# Patient Record
Sex: FEMALE | Race: WHITE | NOT HISPANIC OR LATINO | Employment: FULL TIME | URBAN - METROPOLITAN AREA
[De-identification: names, ages, dates, MRNs, and addresses within clinical notes are randomized per-mention and may not be internally consistent; named-entity substitution may affect disease eponyms.]

---

## 2018-06-15 ENCOUNTER — CONVERSION ENCOUNTER (OUTPATIENT)
Dept: MAMMOGRAPHY | Facility: HOSPITAL | Age: 53
End: 2018-06-15

## 2018-07-27 ENCOUNTER — OFFICE VISIT CONVERTED (OUTPATIENT)
Dept: FAMILY MEDICINE CLINIC | Facility: CLINIC | Age: 53
End: 2018-07-27
Attending: NURSE PRACTITIONER

## 2019-11-01 ENCOUNTER — HOSPITAL ENCOUNTER (OUTPATIENT)
Dept: MAMMOGRAPHY | Facility: HOSPITAL | Age: 54
Discharge: HOME | End: 2019-11-01
Attending: FAMILY MEDICINE

## 2020-02-03 ENCOUNTER — HOSPITAL ENCOUNTER (OUTPATIENT)
Dept: LAB | Facility: HOSPITAL | Age: 55
Discharge: HOME | End: 2020-02-03

## 2020-02-03 LAB
ALBUMIN SERPL-MCNC: 4.5 G/DL (ref 3.5–5)
ALBUMIN/GLOB SERPL: 1.7 {RATIO} (ref 1.4–2.6)
ALP SERPL-CCNC: 47 U/L (ref 53–141)
ALT SERPL-CCNC: 25 U/L (ref 10–40)
ANION GAP SERPL CALC-SCNC: 15 MMOL/L (ref 8–19)
AST SERPL-CCNC: 18 U/L (ref 15–50)
BASOPHILS # BLD AUTO: 0.06 10*3/UL (ref 0–0.2)
BASOPHILS NFR BLD AUTO: 1.1 % (ref 0–3)
BILIRUB SERPL-MCNC: 0.65 MG/DL (ref 0.2–1.3)
BUN SERPL-MCNC: 11 MG/DL (ref 5–25)
BUN/CREAT SERPL: 12 {RATIO} (ref 6–20)
CALCIUM SERPL-MCNC: 9.1 MG/DL (ref 8.7–10.4)
CHLORIDE SERPL-SCNC: 101 MMOL/L (ref 99–111)
CHOLEST SERPL-MCNC: 214 MG/DL (ref 107–200)
CHOLEST/HDLC SERPL: 3.5 {RATIO} (ref 3–6)
CONV ABS IMM GRAN: 0.01 10*3/UL (ref 0–0.2)
CONV CO2: 25 MMOL/L (ref 22–32)
CONV IMMATURE GRAN: 0.2 % (ref 0–1.8)
CONV TOTAL PROTEIN: 7.1 G/DL (ref 6.3–8.2)
CREAT UR-MCNC: 0.91 MG/DL (ref 0.5–0.9)
DEPRECATED RDW RBC AUTO: 40 FL (ref 36.4–46.3)
EOSINOPHIL # BLD AUTO: 0.12 10*3/UL (ref 0–0.7)
EOSINOPHIL # BLD AUTO: 2.1 % (ref 0–7)
ERYTHROCYTE [DISTWIDTH] IN BLOOD BY AUTOMATED COUNT: 11.7 % (ref 11.7–14.4)
GFR SERPLBLD BASED ON 1.73 SQ M-ARVRAT: >60 ML/MIN/{1.73_M2}
GLOBULIN UR ELPH-MCNC: 2.6 G/DL (ref 2–3.5)
GLUCOSE SERPL-MCNC: 92 MG/DL (ref 65–99)
HCT VFR BLD AUTO: 41.2 % (ref 37–47)
HDLC SERPL-MCNC: 62 MG/DL (ref 40–60)
HGB BLD-MCNC: 13.6 G/DL (ref 12–16)
LDLC SERPL CALC-MCNC: 133 MG/DL (ref 70–100)
LYMPHOCYTES # BLD AUTO: 1.5 10*3/UL (ref 1–5)
LYMPHOCYTES NFR BLD AUTO: 26.8 % (ref 20–45)
MCH RBC QN AUTO: 30.8 PG (ref 27–31)
MCHC RBC AUTO-ENTMCNC: 33 G/DL (ref 33–37)
MCV RBC AUTO: 93.2 FL (ref 81–99)
MONOCYTES # BLD AUTO: 0.39 10*3/UL (ref 0.2–1.2)
MONOCYTES NFR BLD AUTO: 7 % (ref 3–10)
NEUTROPHILS # BLD AUTO: 3.51 10*3/UL (ref 2–8)
NEUTROPHILS NFR BLD AUTO: 62.8 % (ref 30–85)
NRBC CBCN: 0 % (ref 0–0.7)
OSMOLALITY SERPL CALC.SUM OF ELEC: 283 MOSM/KG (ref 273–304)
PLATELET # BLD AUTO: 267 10*3/UL (ref 130–400)
PMV BLD AUTO: 10.5 FL (ref 9.4–12.3)
POTASSIUM SERPL-SCNC: 4.2 MMOL/L (ref 3.5–5.3)
RBC # BLD AUTO: 4.42 10*6/UL (ref 4.2–5.4)
SODIUM SERPL-SCNC: 137 MMOL/L (ref 135–147)
TRIGL SERPL-MCNC: 97 MG/DL (ref 40–150)
TSH SERPL-ACNC: 3.04 M[IU]/L (ref 0.27–4.2)
VLDLC SERPL-MCNC: 19 MG/DL (ref 5–37)
WBC # BLD AUTO: 5.59 10*3/UL (ref 4.8–10.8)

## 2020-02-04 LAB
CONV MUMPS ANTIBODY IGG: 267 AU/ML
HBV SURFACE AB SER QL: REACTIVE
MEV IGG SER IA-ACNC: 26.3 AU/ML
RUBV IGG SER-ACNC: 35.01 [IU]/ML
VZV IGG SER IA-ACNC: 854 INDEX

## 2020-06-05 ENCOUNTER — HOSPITAL ENCOUNTER (OUTPATIENT)
Dept: URGENT CARE | Facility: CLINIC | Age: 55
Discharge: HOME | End: 2020-06-05
Attending: NURSE PRACTITIONER

## 2020-06-05 LAB — SARS-COV-2 RNA SPEC QL NAA+PROBE: NOT DETECTED

## 2020-08-08 ENCOUNTER — HOSPITAL ENCOUNTER (OUTPATIENT)
Dept: URGENT CARE | Facility: CLINIC | Age: 55
Discharge: HOME | End: 2020-08-08

## 2020-12-10 ENCOUNTER — HOSPITAL ENCOUNTER (OUTPATIENT)
Dept: URGENT CARE | Facility: CLINIC | Age: 55
Discharge: HOME | End: 2020-12-10
Attending: EMERGENCY MEDICINE

## 2020-12-12 LAB
BACTERIA SPEC AEROBE CULT: NORMAL
SARS-COV-2 RNA SPEC QL NAA+PROBE: DETECTED

## 2020-12-17 ENCOUNTER — HOSPITAL ENCOUNTER (OUTPATIENT)
Dept: URGENT CARE | Facility: CLINIC | Age: 55
Discharge: HOME | End: 2020-12-17

## 2021-02-24 ENCOUNTER — HOSPITAL ENCOUNTER (OUTPATIENT)
Dept: OTHER | Facility: HOSPITAL | Age: 56
Discharge: HOME | End: 2021-02-24

## 2021-02-24 LAB
ALBUMIN SERPL-MCNC: 4.2 G/DL (ref 3.5–5)
ALBUMIN/GLOB SERPL: 1.6 {RATIO} (ref 1.4–2.6)
ALP SERPL-CCNC: 54 U/L (ref 53–141)
ALT SERPL-CCNC: 70 U/L (ref 10–40)
ANION GAP SERPL CALC-SCNC: 11 MMOL/L (ref 8–19)
AST SERPL-CCNC: 53 U/L (ref 15–50)
BASOPHILS # BLD AUTO: 0.06 10*3/UL (ref 0–0.2)
BASOPHILS NFR BLD AUTO: 1.1 % (ref 0–3)
BILIRUB SERPL-MCNC: 0.88 MG/DL (ref 0.2–1.3)
BUN SERPL-MCNC: 11 MG/DL (ref 5–25)
BUN/CREAT SERPL: 10 {RATIO} (ref 6–20)
CALCIUM SERPL-MCNC: 9 MG/DL (ref 8.7–10.4)
CHLORIDE SERPL-SCNC: 103 MMOL/L (ref 99–111)
CHOLEST SERPL-MCNC: 218 MG/DL (ref 107–200)
CHOLEST/HDLC SERPL: 4.2 {RATIO} (ref 3–6)
CONV ABS IMM GRAN: 0.01 10*3/UL (ref 0–0.2)
CONV CO2: 26 MMOL/L (ref 22–32)
CONV IMMATURE GRAN: 0.2 % (ref 0–1.8)
CONV TOTAL PROTEIN: 6.8 G/DL (ref 6.3–8.2)
CREAT UR-MCNC: 1.06 MG/DL (ref 0.5–0.9)
DEPRECATED RDW RBC AUTO: 41.8 FL (ref 36.4–46.3)
EOSINOPHIL # BLD AUTO: 0.13 10*3/UL (ref 0–0.7)
EOSINOPHIL # BLD AUTO: 2.3 % (ref 0–7)
ERYTHROCYTE [DISTWIDTH] IN BLOOD BY AUTOMATED COUNT: 12.4 % (ref 11.7–14.4)
GFR SERPLBLD BASED ON 1.73 SQ M-ARVRAT: 59 ML/MIN/{1.73_M2}
GLOBULIN UR ELPH-MCNC: 2.6 G/DL (ref 2–3.5)
GLUCOSE SERPL-MCNC: 103 MG/DL (ref 65–99)
HCT VFR BLD AUTO: 42.2 % (ref 37–47)
HDLC SERPL-MCNC: 52 MG/DL (ref 40–60)
HGB BLD-MCNC: 13.7 G/DL (ref 12–16)
LDLC SERPL CALC-MCNC: 147 MG/DL (ref 70–100)
LYMPHOCYTES # BLD AUTO: 1.41 10*3/UL (ref 1–5)
LYMPHOCYTES NFR BLD AUTO: 25.4 % (ref 20–45)
MCH RBC QN AUTO: 29.8 PG (ref 27–31)
MCHC RBC AUTO-ENTMCNC: 32.5 G/DL (ref 33–37)
MCV RBC AUTO: 91.9 FL (ref 81–99)
MONOCYTES # BLD AUTO: 0.35 10*3/UL (ref 0.2–1.2)
MONOCYTES NFR BLD AUTO: 6.3 % (ref 3–10)
NEUTROPHILS # BLD AUTO: 3.59 10*3/UL (ref 2–8)
NEUTROPHILS NFR BLD AUTO: 64.7 % (ref 30–85)
NRBC CBCN: 0 % (ref 0–0.7)
OSMOLALITY SERPL CALC.SUM OF ELEC: 282 MOSM/KG (ref 273–304)
PLATELET # BLD AUTO: 286 10*3/UL (ref 130–400)
PMV BLD AUTO: 9.8 FL (ref 9.4–12.3)
POTASSIUM SERPL-SCNC: 4.1 MMOL/L (ref 3.5–5.3)
RBC # BLD AUTO: 4.59 10*6/UL (ref 4.2–5.4)
SODIUM SERPL-SCNC: 136 MMOL/L (ref 135–147)
TRIGL SERPL-MCNC: 96 MG/DL (ref 40–150)
TSH SERPL-ACNC: 2.76 M[IU]/L (ref 0.27–4.2)
VLDLC SERPL-MCNC: 19 MG/DL (ref 5–37)
WBC # BLD AUTO: 5.55 10*3/UL (ref 4.8–10.8)

## 2021-02-25 LAB
EST. AVERAGE GLUCOSE BLD GHB EST-MCNC: 105 MG/DL
HBA1C MFR BLD: 5.3 % (ref 3.5–5.7)

## 2021-05-16 VITALS
RESPIRATION RATE: 16 BRPM | OXYGEN SATURATION: 99 % | WEIGHT: 168 LBS | TEMPERATURE: 98.9 F | HEART RATE: 82 BPM | HEIGHT: 63 IN | SYSTOLIC BLOOD PRESSURE: 123 MMHG | DIASTOLIC BLOOD PRESSURE: 80 MMHG | BODY MASS INDEX: 29.77 KG/M2

## 2021-08-09 PROBLEM — R61 NIGHT SWEATS: Status: ACTIVE | Noted: 2021-08-09

## 2021-08-28 PROCEDURE — U0003 INFECTIOUS AGENT DETECTION BY NUCLEIC ACID (DNA OR RNA); SEVERE ACUTE RESPIRATORY SYNDROME CORONAVIRUS 2 (SARS-COV-2) (CORONAVIRUS DISEASE [COVID-19]), AMPLIFIED PROBE TECHNIQUE, MAKING USE OF HIGH THROUGHPUT TECHNOLOGIES AS DESCRIBED BY CMS-2020-01-R: HCPCS | Performed by: NURSE PRACTITIONER

## 2021-08-31 ENCOUNTER — TELEPHONE (OUTPATIENT)
Dept: URGENT CARE | Facility: CLINIC | Age: 56
End: 2021-08-31

## 2021-09-01 ENCOUNTER — TELEPHONE (OUTPATIENT)
Dept: URGENT CARE | Facility: CLINIC | Age: 56
End: 2021-09-01

## 2021-09-01 NOTE — TELEPHONE ENCOUNTER
----- Message from Jaron Burnham MD sent at 8/30/2021  9:43 PM EDT -----  Please call the patient regarding negative covid.

## 2021-09-02 ENCOUNTER — TELEPHONE (OUTPATIENT)
Dept: OTHER | Facility: OTHER | Age: 56
End: 2021-09-02

## 2021-09-29 ENCOUNTER — OFFICE VISIT (OUTPATIENT)
Dept: FAMILY MEDICINE CLINIC | Facility: CLINIC | Age: 56
End: 2021-09-29

## 2021-09-29 VITALS
BODY MASS INDEX: 34.55 KG/M2 | HEART RATE: 83 BPM | DIASTOLIC BLOOD PRESSURE: 80 MMHG | SYSTOLIC BLOOD PRESSURE: 130 MMHG | WEIGHT: 183 LBS | TEMPERATURE: 97.9 F | OXYGEN SATURATION: 99 % | HEIGHT: 61 IN

## 2021-09-29 DIAGNOSIS — E78.2 MIXED HYPERLIPIDEMIA: ICD-10-CM

## 2021-09-29 DIAGNOSIS — E66.9 OBESITY (BMI 30.0-34.9): ICD-10-CM

## 2021-09-29 DIAGNOSIS — R23.2 HOT FLASHES: ICD-10-CM

## 2021-09-29 DIAGNOSIS — R53.83 FATIGUE, UNSPECIFIED TYPE: ICD-10-CM

## 2021-09-29 DIAGNOSIS — R61 NIGHT SWEATS: ICD-10-CM

## 2021-09-29 DIAGNOSIS — N18.9 CHRONIC KIDNEY DISEASE, UNSPECIFIED CKD STAGE: ICD-10-CM

## 2021-09-29 DIAGNOSIS — R63.5 WEIGHT GAIN: ICD-10-CM

## 2021-09-29 DIAGNOSIS — J30.2 SEASONAL ALLERGIES: ICD-10-CM

## 2021-09-29 DIAGNOSIS — Z12.11 SCREENING FOR COLON CANCER: Primary | ICD-10-CM

## 2021-09-29 DIAGNOSIS — Z12.31 SCREENING MAMMOGRAM, ENCOUNTER FOR: ICD-10-CM

## 2021-09-29 DIAGNOSIS — R74.8 ELEVATED LIVER ENZYMES: ICD-10-CM

## 2021-09-29 PROBLEM — E66.811 OBESITY (BMI 30.0-34.9): Status: ACTIVE | Noted: 2021-09-29

## 2021-09-29 PROBLEM — N20.0 KIDNEY STONES: Status: ACTIVE | Noted: 2021-09-29

## 2021-09-29 LAB
ALBUMIN SERPL-MCNC: 4.6 G/DL (ref 3.5–5.2)
ALBUMIN/GLOB SERPL: 1.9 G/DL
ALP SERPL-CCNC: 51 U/L (ref 39–117)
ALT SERPL W P-5'-P-CCNC: 62 U/L (ref 1–33)
ANION GAP SERPL CALCULATED.3IONS-SCNC: 10 MMOL/L (ref 5–15)
AST SERPL-CCNC: 37 U/L (ref 1–32)
BASOPHILS # BLD AUTO: 0.07 10*3/MM3 (ref 0–0.2)
BASOPHILS NFR BLD AUTO: 1.3 % (ref 0–1.5)
BILIRUB SERPL-MCNC: 0.5 MG/DL (ref 0–1.2)
BUN SERPL-MCNC: 12 MG/DL (ref 6–20)
BUN/CREAT SERPL: 12.9 (ref 7–25)
CALCIUM SPEC-SCNC: 9.4 MG/DL (ref 8.6–10.5)
CHLORIDE SERPL-SCNC: 103 MMOL/L (ref 98–107)
CHOLEST SERPL-MCNC: 218 MG/DL (ref 0–200)
CO2 SERPL-SCNC: 25 MMOL/L (ref 22–29)
CREAT SERPL-MCNC: 0.93 MG/DL (ref 0.57–1)
DEPRECATED RDW RBC AUTO: 43.7 FL (ref 37–54)
EOSINOPHIL # BLD AUTO: 0.15 10*3/MM3 (ref 0–0.4)
EOSINOPHIL NFR BLD AUTO: 2.7 % (ref 0.3–6.2)
ERYTHROCYTE [DISTWIDTH] IN BLOOD BY AUTOMATED COUNT: 12.6 % (ref 12.3–15.4)
FERRITIN SERPL-MCNC: 167 NG/ML (ref 13–150)
FOLATE SERPL-MCNC: 10.8 NG/ML (ref 4.78–24.2)
GFR SERPL CREATININE-BSD FRML MDRD: 63 ML/MIN/1.73
GLOBULIN UR ELPH-MCNC: 2.4 GM/DL
GLUCOSE SERPL-MCNC: 95 MG/DL (ref 65–99)
HCT VFR BLD AUTO: 42 % (ref 34–46.6)
HDLC SERPL-MCNC: 52 MG/DL (ref 40–60)
HGB BLD-MCNC: 14 G/DL (ref 12–15.9)
IMM GRANULOCYTES # BLD AUTO: 0.01 10*3/MM3 (ref 0–0.05)
IMM GRANULOCYTES NFR BLD AUTO: 0.2 % (ref 0–0.5)
IRON 24H UR-MRATE: 69 MCG/DL (ref 37–145)
IRON SATN MFR SERPL: 16 % (ref 20–50)
LDLC SERPL CALC-MCNC: 145 MG/DL (ref 0–100)
LDLC/HDLC SERPL: 2.75 {RATIO}
LYMPHOCYTES # BLD AUTO: 2.02 10*3/MM3 (ref 0.7–3.1)
LYMPHOCYTES NFR BLD AUTO: 36.4 % (ref 19.6–45.3)
MAGNESIUM SERPL-MCNC: 1.9 MG/DL (ref 1.6–2.6)
MCH RBC QN AUTO: 30.9 PG (ref 26.6–33)
MCHC RBC AUTO-ENTMCNC: 33.3 G/DL (ref 31.5–35.7)
MCV RBC AUTO: 92.7 FL (ref 79–97)
MONOCYTES # BLD AUTO: 0.54 10*3/MM3 (ref 0.1–0.9)
MONOCYTES NFR BLD AUTO: 9.7 % (ref 5–12)
NEUTROPHILS NFR BLD AUTO: 2.76 10*3/MM3 (ref 1.7–7)
NEUTROPHILS NFR BLD AUTO: 49.7 % (ref 42.7–76)
NRBC BLD AUTO-RTO: 0 /100 WBC (ref 0–0.2)
PLATELET # BLD AUTO: 316 10*3/MM3 (ref 140–450)
PMV BLD AUTO: 10.5 FL (ref 6–12)
POTASSIUM SERPL-SCNC: 4.2 MMOL/L (ref 3.5–5.2)
PROT SERPL-MCNC: 7 G/DL (ref 6–8.5)
RBC # BLD AUTO: 4.53 10*6/MM3 (ref 3.77–5.28)
SODIUM SERPL-SCNC: 138 MMOL/L (ref 136–145)
T3 SERPL-MCNC: 125 NG/DL (ref 80–200)
T4 SERPL-MCNC: 7.77 MCG/DL (ref 4.5–11.7)
TIBC SERPL-MCNC: 423 MCG/DL (ref 298–536)
TRANSFERRIN SERPL-MCNC: 284 MG/DL (ref 200–360)
TRIGL SERPL-MCNC: 116 MG/DL (ref 0–150)
TSH SERPL DL<=0.05 MIU/L-ACNC: 3.43 UIU/ML (ref 0.27–4.2)
VIT B12 BLD-MCNC: 497 PG/ML (ref 211–946)
VLDLC SERPL-MCNC: 21 MG/DL (ref 5–40)
WBC # BLD AUTO: 5.55 10*3/MM3 (ref 3.4–10.8)

## 2021-09-29 PROCEDURE — 84480 ASSAY TRIIODOTHYRONINE (T3): CPT | Performed by: NURSE PRACTITIONER

## 2021-09-29 PROCEDURE — 99214 OFFICE O/P EST MOD 30 MIN: CPT | Performed by: NURSE PRACTITIONER

## 2021-09-29 PROCEDURE — 85025 COMPLETE CBC W/AUTO DIFF WBC: CPT | Performed by: NURSE PRACTITIONER

## 2021-09-29 PROCEDURE — 84443 ASSAY THYROID STIM HORMONE: CPT | Performed by: NURSE PRACTITIONER

## 2021-09-29 PROCEDURE — 84436 ASSAY OF TOTAL THYROXINE: CPT | Performed by: NURSE PRACTITIONER

## 2021-09-29 PROCEDURE — 82728 ASSAY OF FERRITIN: CPT | Performed by: NURSE PRACTITIONER

## 2021-09-29 PROCEDURE — 83735 ASSAY OF MAGNESIUM: CPT | Performed by: NURSE PRACTITIONER

## 2021-09-29 PROCEDURE — 80053 COMPREHEN METABOLIC PANEL: CPT | Performed by: NURSE PRACTITIONER

## 2021-09-29 PROCEDURE — 83540 ASSAY OF IRON: CPT | Performed by: NURSE PRACTITIONER

## 2021-09-29 PROCEDURE — 82746 ASSAY OF FOLIC ACID SERUM: CPT | Performed by: NURSE PRACTITIONER

## 2021-09-29 PROCEDURE — 82607 VITAMIN B-12: CPT | Performed by: NURSE PRACTITIONER

## 2021-09-29 PROCEDURE — 80061 LIPID PANEL: CPT | Performed by: NURSE PRACTITIONER

## 2021-09-29 PROCEDURE — 84466 ASSAY OF TRANSFERRIN: CPT | Performed by: NURSE PRACTITIONER

## 2021-09-29 RX ORDER — VENLAFAXINE HYDROCHLORIDE 37.5 MG/1
37.5 CAPSULE, EXTENDED RELEASE ORAL DAILY
Qty: 30 CAPSULE | Refills: 2 | Status: SHIPPED | OUTPATIENT
Start: 2021-09-29 | End: 2021-10-27 | Stop reason: SDUPTHER

## 2021-09-29 RX ORDER — CLONIDINE HYDROCHLORIDE 0.1 MG/1
0.1 TABLET ORAL NIGHTLY
Qty: 30 TABLET | Refills: 2 | Status: SHIPPED | OUTPATIENT
Start: 2021-09-29 | End: 2021-10-27 | Stop reason: SDUPTHER

## 2021-09-29 NOTE — PROGRESS NOTES
[facility] [[id number]]     New Patient Office Visit      Patient Name: Luis Masterson  : 1965   MRN: [id number]     Chief Complaint:    Chief Complaint   Patient presents with   • Fatigue   • Weight Gain   • Establish Care       History of Present Illness: Luis Masterson is a 56 y.o. female who is here today to establish care.  Follow up hyperlipidemia, elevated liver enzymes, and seasonal allergies    C/O-hormone or thyroid issue, weight gain, fatigue, hot flashes and mood swings   Feels she is going through menopause   Family history of breast cancer mother, maternal grandmother and aunt     PCP-patient reports she has not had PCP in long time  Labs- per employee health reviewed liver enzymes elevated and chronic any disease  Gmzodvblm-  Pap- dr mi  Colonoscopy none   LMP Ablation   Subjective      Review of Systems:   Review of Systems   Constitutional: Positive for unexpected weight change. Negative for fever.   HENT: Negative for ear pain, sinus pain and sore throat.    Eyes: Negative for discharge.   Cardiovascular: Negative for chest pain.   Gastrointestinal: Negative for abdominal pain, nausea and vomiting.   Genitourinary: Negative for dysuria.   Musculoskeletal: Negative for myalgias.   Skin: Negative for rash.   Allergic/Immunologic: Positive for environmental allergies.   Neurological: Negative for headaches.        Past Medical History:   Past Medical History:   Diagnosis Date   • Kidney stones    • Night sweats        Past Surgical History:   Past Surgical History:   Procedure Laterality Date   • CARPAL TUNNEL RELEASE     • CHOLECYSTECTOMY     • ENDOMETRIAL ABLATION     • KIDNEY STONE SURGERY     • MASTECTOMY Bilateral    • THORACIC OUTLET SURGERY         Family History:   Family History   Problem Relation Age of Onset   • Breast cancer Other    • Stroke Other    • Heart disease Other    • Diabetes Other        Social History:   Social History     Socioeconomic History  "  • Marital status:      Spouse name: Not on file   • Number of children: Not on file   • Years of education: Not on file   • Highest education level: Not on file   Tobacco Use   • Smoking status: Former Smoker   • Smokeless tobacco: Never Used   Vaping Use   • Vaping Use: Never used   Substance and Sexual Activity   • Alcohol use: Never   • Drug use: Never   • Sexual activity: Defer       Medications:     Current Outpatient Medications:   •  cloNIDine (Catapres) 0.1 MG tablet, Take 1 tablet by mouth Every Night., Disp: 30 tablet, Rfl: 2  •  Liraglutide (SAXENDA) 18 MG/3ML injection pen, Inject 3 mg under the skin into the appropriate area as directed Daily., Disp: 5 pen, Rfl: 5  •  venlafaxine XR (Effexor XR) 37.5 MG 24 hr capsule, Take 1 capsule by mouth Daily., Disp: 30 capsule, Rfl: 2    Allergies:   Allergies   Allergen Reactions   • Sulfa Antibiotics Swelling       Objective     Physical Exam:  Vital Signs:   Vitals:    09/29/21 0723   BP: 130/80   Pulse: 83   Temp: 97.9 °F (36.6 °C)   SpO2: 99%   Weight: 83 kg (183 lb)   Height: 154.9 cm (61\")     Body mass index is 34.58 kg/m².     Physical Exam  HENT:      Right Ear: Tympanic membrane normal.      Left Ear: Tympanic membrane normal.      Nose: Nose normal.      Mouth/Throat:      Mouth: Mucous membranes are moist.   Eyes:      Conjunctiva/sclera: Conjunctivae normal.      Pupils: Pupils are equal, round, and reactive to light.   Neck:      Vascular: No carotid bruit.   Cardiovascular:      Rate and Rhythm: Normal rate and regular rhythm.      Heart sounds: Normal heart sounds. No murmur heard.     Pulmonary:      Effort: Pulmonary effort is normal.      Breath sounds: Normal breath sounds.   Abdominal:      General: Bowel sounds are normal.      Palpations: Abdomen is soft.   Musculoskeletal:      Cervical back: Neck supple. No tenderness.      Right lower leg: No edema.      Left lower leg: No edema.   Skin:     General: Skin is warm and dry.      " Capillary Refill: Capillary refill takes less than 2 seconds.   Neurological:      Mental Status: She is alert and oriented to person, place, and time.   Psychiatric:         Mood and Affect: Mood normal.         Behavior: Behavior normal.             Assessment / Plan      Assessment/Plan:   Diagnoses and all orders for this visit:    1. Screening for colon cancer (Primary)  -     Ambulatory Referral For Screening Colonoscopy    2. Chronic kidney disease, unspecified CKD stage    3. Elevated liver enzymes  -     Comprehensive Metabolic Panel    4. Mixed hyperlipidemia  -     Lipid Panel    5. Seasonal allergies    6. Fatigue, unspecified type  -     TSH  -     T3  -     T4  -     Iron Profile  -     Ferritin  -     Vitamin B12  -     CBC Auto Differential  -     Folate  -     Magnesium    7. Hot flashes  -     Magnesium    8. Weight gain  -     TSH  -     T3  -     T4  -     Magnesium    9. Night sweats  -     Magnesium    10. Screening mammogram, encounter for  -     Mammo Screening Digital Tomosynthesis Bilateral With CAD; Future    11. Obesity (BMI 30.0-34.9)    Other orders  -     venlafaxine XR (Effexor XR) 37.5 MG 24 hr capsule; Take 1 capsule by mouth Daily.  Dispense: 30 capsule; Refill: 2  -     cloNIDine (Catapres) 0.1 MG tablet; Take 1 tablet by mouth Every Night.  Dispense: 30 tablet; Refill: 2  -     Liraglutide (SAXENDA) 18 MG/3ML injection pen; Inject 3 mg under the skin into the appropriate area as directed Daily.  Dispense: 5 pen; Refill: 5       Hot flashes we will start Effexor once daily in the morning with food  Night sweats will start clonidine prior to bedtime  Weight gain and fatigue will obtain labs  Elevated liver enzymes will recheck today in office  Chronic kidney disease avoid NSAIDs increase water intake will recheck today in office  Obesity we will start Saxenda recommend increase caloric intake exercise 30 minutes daily patient ports he drinks 1 to 2 gallons of water per day  We will  "provide screen mammogram order patient denies lumps mass tenderness blood or discharge from the nipple  We will follow-up in 1 month for Pap smear    Follow Up:   Return in about 1 month (around 10/29/2021).    Melinda Torres, APRN      \"Please note that portions of this note were completed with a voice recognition program.\"    "

## 2021-10-07 DIAGNOSIS — R74.8 ELEVATED LIVER ENZYMES: Primary | ICD-10-CM

## 2021-10-21 ENCOUNTER — HOSPITAL ENCOUNTER (OUTPATIENT)
Dept: ULTRASOUND IMAGING | Facility: HOSPITAL | Age: 56
Discharge: HOME | End: 2021-10-21
Admitting: NURSE PRACTITIONER

## 2021-10-21 DIAGNOSIS — R74.8 ELEVATED LIVER ENZYMES: ICD-10-CM

## 2021-10-21 PROCEDURE — 76705 ECHO EXAM OF ABDOMEN: CPT

## 2021-10-27 ENCOUNTER — OFFICE VISIT (OUTPATIENT)
Dept: FAMILY MEDICINE CLINIC | Facility: CLINIC | Age: 56
End: 2021-10-27

## 2021-10-27 VITALS
OXYGEN SATURATION: 97 % | HEIGHT: 61 IN | TEMPERATURE: 98 F | WEIGHT: 170 LBS | SYSTOLIC BLOOD PRESSURE: 129 MMHG | DIASTOLIC BLOOD PRESSURE: 84 MMHG | BODY MASS INDEX: 32.1 KG/M2 | HEART RATE: 88 BPM

## 2021-10-27 DIAGNOSIS — E66.9 OBESITY (BMI 30.0-34.9): ICD-10-CM

## 2021-10-27 DIAGNOSIS — B37.89 CANDIDIASIS OF BREAST: ICD-10-CM

## 2021-10-27 DIAGNOSIS — R61 NIGHT SWEATS: ICD-10-CM

## 2021-10-27 DIAGNOSIS — N89.8 VAGINAL LESION: ICD-10-CM

## 2021-10-27 DIAGNOSIS — Z01.419 ENCOUNTER FOR GYNECOLOGICAL EXAMINATION: ICD-10-CM

## 2021-10-27 DIAGNOSIS — R23.2 HOT FLASHES: ICD-10-CM

## 2021-10-27 DIAGNOSIS — Z12.4 SCREENING FOR CERVICAL CANCER: ICD-10-CM

## 2021-10-27 LAB
CANDIDA SPECIES: NEGATIVE
GARDNERELLA VAGINALIS: NEGATIVE
T VAGINALIS DNA VAG QL PROBE+SIG AMP: NEGATIVE

## 2021-10-27 PROCEDURE — G0123 SCREEN CERV/VAG THIN LAYER: HCPCS | Performed by: NURSE PRACTITIONER

## 2021-10-27 PROCEDURE — 87510 GARDNER VAG DNA DIR PROBE: CPT | Performed by: NURSE PRACTITIONER

## 2021-10-27 PROCEDURE — 87529 HSV DNA AMP PROBE: CPT | Performed by: NURSE PRACTITIONER

## 2021-10-27 PROCEDURE — 99396 PREV VISIT EST AGE 40-64: CPT | Performed by: NURSE PRACTITIONER

## 2021-10-27 PROCEDURE — 87480 CANDIDA DNA DIR PROBE: CPT | Performed by: NURSE PRACTITIONER

## 2021-10-27 PROCEDURE — 87660 TRICHOMONAS VAGIN DIR PROBE: CPT | Performed by: NURSE PRACTITIONER

## 2021-10-27 PROCEDURE — 99213 OFFICE O/P EST LOW 20 MIN: CPT | Performed by: NURSE PRACTITIONER

## 2021-10-27 RX ORDER — FLUCONAZOLE 150 MG/1
150 TABLET ORAL
Qty: 3 TABLET | Refills: 0 | Status: SHIPPED | OUTPATIENT
Start: 2021-10-27 | End: 2021-11-05

## 2021-10-27 RX ORDER — NYSTATIN AND TRIAMCINOLONE ACETONIDE 100000; 1 [USP'U]/G; MG/G
1 OINTMENT TOPICAL 2 TIMES DAILY
Qty: 60 G | Refills: 1 | Status: SHIPPED | OUTPATIENT
Start: 2021-10-27 | End: 2022-02-22

## 2021-10-27 RX ORDER — PEN NEEDLE, DIABETIC 32GX 5/32"
1 NEEDLE, DISPOSABLE MISCELLANEOUS DAILY
Qty: 100 EACH | Refills: 2 | Status: SHIPPED | OUTPATIENT
Start: 2021-10-27 | End: 2023-02-24

## 2021-10-27 RX ORDER — CLONIDINE HYDROCHLORIDE 0.1 MG/1
0.1 TABLET ORAL NIGHTLY
Qty: 90 TABLET | Refills: 1 | Status: SHIPPED | OUTPATIENT
Start: 2021-10-27 | End: 2022-02-22

## 2021-10-27 RX ORDER — VENLAFAXINE HYDROCHLORIDE 37.5 MG/1
37.5 CAPSULE, EXTENDED RELEASE ORAL DAILY
Qty: 90 CAPSULE | Refills: 1 | Status: SHIPPED | OUTPATIENT
Start: 2021-10-27 | End: 2022-02-22 | Stop reason: SDUPTHER

## 2021-10-27 NOTE — PROGRESS NOTES
Female Physical / PAP Note      Patient Name: Luis Masterson  : 1965   MRN: [id number]     Chief Complaint:    Chief Complaint   Patient presents with   • Gynecologic Exam       History of Present Illness: Luis Masterson is a 56 y.o. female who is here today for her annual health maintenance and physical.  Follow up hot flashes, night sweats, and obesity  Start of effexor, clonidine, and saxenda     C/O-lesion  on vaginal area since  when she suffered from covid, tender when she touches it     Pap-   LMP ablation   [id number]  Colonoscopy-calling to schedule   Loss of 13 lbs in there past month     Subjective      Review of Systems:   Review of Systems   Constitutional: Negative for diaphoresis and fever.   Respiratory: Negative for shortness of breath.    Cardiovascular: Negative for chest pain.   Gastrointestinal: Negative for abdominal pain, diarrhea, nausea and vomiting.   Genitourinary: Negative for dysuria.   Psychiatric/Behavioral: The patient is not nervous/anxious.       Breast - No tenderness, lumps, discharge, or blood from nipples.      Past Medical History, Social History, Family History and Care Team were all reviewed with patient and updated as appropriate.     Medications:     Current Outpatient Medications:   •  cloNIDine (Catapres) 0.1 MG tablet, Take 1 tablet by mouth Every Night., Disp: 90 tablet, Rfl: 1  •  fluconazole (Diflucan) 150 MG tablet, Take 1 tablet by mouth Every 72 (Seventy-Two) Hours for 3 doses., Disp: 3 tablet, Rfl: 0  •  Insulin Pen Needle (BD Pen Needle Lillie 2nd Gen) 32G X 4 MM misc, 1 each Daily., Disp: 100 each, Rfl: 2  •  Liraglutide (SAXENDA) 18 MG/3ML injection pen, Inject 3 mg under the skin into the appropriate area as directed Daily., Disp: 5 pen, Rfl: 5  •  nystatin-triamcinolone (MYCOLOG) 910227-2.1 UNIT/GM-% ointment, Apply 1 application topically to the appropriate area as directed 2 (Two) Times a Day., Disp: 60 g, Rfl: 1  •   "venlafaxine XR (Effexor XR) 37.5 MG 24 hr capsule, Take 1 capsule by mouth Daily., Disp: 90 capsule, Rfl: 1    Allergies:   Allergies   Allergen Reactions   • Sulfa Antibiotics Swelling           Objective     Physical Exam:  Vital Signs:   Vitals:    10/27/21 0930   BP: 129/84   Pulse: 88   Temp: 98 °F (36.7 °C)   SpO2: 97%   Weight: 77.1 kg (170 lb)   Height: 154.9 cm (61\")     Body mass index is 32.12 kg/m².     Physical Exam  Neck:      Vascular: No carotid bruit.   Cardiovascular:      Rate and Rhythm: Normal rate and regular rhythm.      Heart sounds: Normal heart sounds.   Pulmonary:      Effort: Pulmonary effort is normal.      Breath sounds: Normal breath sounds.   Abdominal:      General: Bowel sounds are normal.      Palpations: Abdomen is soft.   Genitourinary:     Labia:         Left: Lesion present.       Vagina: No vaginal discharge.      Cervix: Normal.      Uterus: Normal.       Adnexa: Right adnexa normal and left adnexa normal.      Rectum: Normal.          Comments: Ulcerated lesion areas of white elevation   Musculoskeletal:      Right lower leg: No edema.      Left lower leg: No edema.   Skin:     General: Skin is warm and dry.   Neurological:      Mental Status: She is alert.   Psychiatric:         Mood and Affect: Mood normal.         Behavior: Behavior normal.             Assessment / Plan      Assessment/Plan:   Diagnoses and all orders for this visit:    1. Hot flashes    2. Night sweats    3. Encounter for gynecological examination  -     Gardnerella vaginalis, Trichomonas vaginalis, Candida albicans, DNA - Swab, Vagina    4. Screening for cervical cancer  -     PAP, Liquid Based ([facility] Only)    5. Obesity (BMI 30.0-34.9)    6. Candidiasis of breast    7. Vaginal lesion  -     Chlamydia trachomatis, Neisseria gonorrhoeae, Trichomonas vaginalis, PCR - Swab, Vagina; Future  -     Chlamydia trachomatis, Neisseria gonorrhoeae, Trichomonas vaginalis, PCR - Swab, Vagina    Other orders  -     " "Liraglutide (SAXENDA) 18 MG/3ML injection pen; Inject 3 mg under the skin into the appropriate area as directed Daily.  Dispense: 5 pen; Refill: 5  -     venlafaxine XR (Effexor XR) 37.5 MG 24 hr capsule; Take 1 capsule by mouth Daily.  Dispense: 90 capsule; Refill: 1  -     cloNIDine (Catapres) 0.1 MG tablet; Take 1 tablet by mouth Every Night.  Dispense: 90 tablet; Refill: 1  -     Insulin Pen Needle (BD Pen Needle Lillie 2nd Gen) 32G X 4 MM misc; 1 each Daily.  Dispense: 100 each; Refill: 2  -     nystatin-triamcinolone (MYCOLOG) 881528-6.1 UNIT/GM-% ointment; Apply 1 application topically to the appropriate area as directed 2 (Two) Times a Day.  Dispense: 60 g; Refill: 1  -     fluconazole (Diflucan) 150 MG tablet; Take 1 tablet by mouth Every 72 (Seventy-Two) Hours for 3 doses.  Dispense: 3 tablet; Refill: 0    Hot flashes night sweats currently controlled with Effexor and clonidine will provide refills  Candidiasis of breast we will treat with Mycolog cream and Diflucan recommend keeping area clean and dry  Obesity we will continue Saxenda patient has lost 13 pounds in the past 4 weeks recommend exercise 30 minutes daily  Vaginal lesion will obtain viral swab if negative will recommend biopsy        Follow Up:   Return in about 4 months (around 2/27/2022).    Healthcare Maintenance:   Counseling provided on screening mammogram, screening colonoscopy, and diet and exercise   Luis Masterson voices understanding and acceptance of this advice and will call back with any further questions or concerns. AVS with preventive healthcare tips printed for patient.     Nasima Torres, APRN    \"Please note that portions of this note were completed with a voice recognition program.\"    "

## 2021-10-28 ENCOUNTER — TELEPHONE (OUTPATIENT)
Dept: FAMILY MEDICINE CLINIC | Facility: CLINIC | Age: 56
End: 2021-10-28

## 2021-10-28 LAB
HSV1 DNA SPEC QL NAA+PROBE: NOT DETECTED
HSV2 DNA SPEC QL NAA+PROBE: NOT DETECTED

## 2021-10-28 NOTE — TELEPHONE ENCOUNTER
----- Message from RODGER Joyner sent at 10/28/2021  1:31 PM EDT -----  NEGATIVE. NOT HERPES. NEED TO SCHEDULE APPT WITH ME FOR SHAVE BIOPSY OF VAGINAL LESION ASAP

## 2021-10-29 ENCOUNTER — OFFICE VISIT (OUTPATIENT)
Dept: FAMILY MEDICINE CLINIC | Facility: CLINIC | Age: 56
End: 2021-10-29

## 2021-10-29 VITALS
HEIGHT: 61 IN | HEART RATE: 86 BPM | BODY MASS INDEX: 32.1 KG/M2 | WEIGHT: 170 LBS | DIASTOLIC BLOOD PRESSURE: 81 MMHG | SYSTOLIC BLOOD PRESSURE: 120 MMHG | TEMPERATURE: 97.6 F | OXYGEN SATURATION: 98 %

## 2021-10-29 DIAGNOSIS — Z79.899 MEDICATION MANAGEMENT: ICD-10-CM

## 2021-10-29 DIAGNOSIS — R10.2 VAGINAL PAIN: ICD-10-CM

## 2021-10-29 DIAGNOSIS — N89.8 VAGINAL LESION: Primary | ICD-10-CM

## 2021-10-29 LAB
AMPHET+METHAMPHET UR QL: NEGATIVE
BARBITURATES UR QL SCN: NEGATIVE
BENZODIAZ UR QL SCN: NEGATIVE
CANNABINOIDS SERPL QL: NEGATIVE
COCAINE UR QL: NEGATIVE
CYTOLOGIST CVX/VAG CYTO: NORMAL
CYTOLOGY CVX/VAG DOC CYTO: NORMAL
DX ICD CODE: NORMAL
HIV 1 & 2 AB SER-IMP: NORMAL
METHADONE UR QL SCN: NEGATIVE
OPIATES UR QL: NEGATIVE
OTHER STN SPEC: NORMAL
OXYCODONE UR QL SCN: NEGATIVE
STAT OF ADQ CVX/VAG CYTO-IMP: NORMAL

## 2021-10-29 PROCEDURE — 80307 DRUG TEST PRSMV CHEM ANLYZR: CPT | Performed by: NURSE PRACTITIONER

## 2021-10-29 PROCEDURE — 99213 OFFICE O/P EST LOW 20 MIN: CPT | Performed by: NURSE PRACTITIONER

## 2021-10-29 PROCEDURE — 88305 TISSUE EXAM BY PATHOLOGIST: CPT | Performed by: NURSE PRACTITIONER

## 2021-10-29 RX ORDER — TRAMADOL HYDROCHLORIDE 50 MG/1
50 TABLET ORAL EVERY 6 HOURS PRN
Qty: 60 TABLET | Refills: 0 | Status: SHIPPED | OUTPATIENT
Start: 2021-10-29 | End: 2022-02-22

## 2021-10-29 NOTE — PROGRESS NOTES
Follow Up Office Visit      Patient Name: Luis Masterson  : 1965   MRN: [id number]     Chief Complaint:    Chief Complaint   Patient presents with   • Biopsy       History of Present Illness: Luis Masterson is a 56 y.o. female who is here today for  Pt is here for a shave biopsy of vaginal lesion, pt denies any use of anticoagulants   HSV swab negative for vaginal lesion    Subjective      Review of Systems:   Review of Systems   Constitutional: Negative for fever.   Respiratory: Negative for shortness of breath.    Cardiovascular: Negative for chest pain.   Gastrointestinal: Negative for abdominal pain, nausea and vomiting.   Genitourinary: Negative for dysuria and vaginal discharge.   Skin:        Skin lesion left vaginal lip        Past Medical History:   Past Medical History:   Diagnosis Date   • Kidney stones    • Night sweats        Past Surgical History:   Past Surgical History:   Procedure Laterality Date   • CARPAL TUNNEL RELEASE     • CHOLECYSTECTOMY     • ENDOMETRIAL ABLATION     • KIDNEY STONE SURGERY     • MASTECTOMY Bilateral    • THORACIC OUTLET SURGERY         Family History:   Family History   Problem Relation Age of Onset   • Breast cancer Other    • Stroke Other    • Heart disease Other    • Diabetes Other        Social History:   Social History     Socioeconomic History   • Marital status:    Tobacco Use   • Smoking status: Former Smoker   • Smokeless tobacco: Never Used   Vaping Use   • Vaping Use: Never used   Substance and Sexual Activity   • Alcohol use: Never   • Drug use: Never   • Sexual activity: Defer       Medications:     Current Outpatient Medications:   •  cloNIDine (Catapres) 0.1 MG tablet, Take 1 tablet by mouth Every Night., Disp: 90 tablet, Rfl: 1  •  fluconazole (Diflucan) 150 MG tablet, Take 1 tablet by mouth Every 72 (Seventy-Two) Hours for 3 doses., Disp: 3 tablet, Rfl: 0  •  Insulin Pen Needle (BD Pen Needle Lillie 2nd Gen) 32G X 4 MM misc, 1 each Daily.,  "Disp: 100 each, Rfl: 2  •  Liraglutide (SAXENDA) 18 MG/3ML injection pen, Inject 3 mg under the skin into the appropriate area as directed Daily., Disp: 5 pen, Rfl: 5  •  nystatin-triamcinolone (MYCOLOG) 724656-7.1 UNIT/GM-% ointment, Apply 1 application topically to the appropriate area as directed 2 (Two) Times a Day., Disp: 60 g, Rfl: 1  •  traMADol (ULTRAM) 50 MG tablet, Take 1 tablet by mouth Every 6 (Six) Hours As Needed for Moderate Pain ., Disp: 60 tablet, Rfl: 0  •  venlafaxine XR (Effexor XR) 37.5 MG 24 hr capsule, Take 1 capsule by mouth Daily., Disp: 90 capsule, Rfl: 1    Allergies:   Allergies   Allergen Reactions   • Sulfa Antibiotics Swelling         Objective     Physical Exam:  Vital Signs:   Vitals:    10/29/21 1116   BP: 120/81   Pulse: 86   Temp: 97.6 °F (36.4 °C)   SpO2: 98%   Weight: 77.1 kg (170 lb)   Height: 154.9 cm (61\")     Body mass index is 32.12 kg/m².     Physical Exam  Cardiovascular:      Rate and Rhythm: Normal rate and regular rhythm.      Heart sounds: Normal heart sounds. No murmur heard.      Pulmonary:      Effort: Pulmonary effort is normal.      Breath sounds: Normal breath sounds.   Abdominal:      General: Bowel sounds are normal.      Palpations: Abdomen is soft.   Genitourinary:         Comments: Raised erythematous white and gray discolartion  Skin:     General: Skin is warm and dry.   Neurological:      Mental Status: She is alert.       .Biopsy    Date/Time: 10/29/2021 12:39 PM  Performed by: Nasima Torres APRN  Authorized by: Nasima Torres APRN     Procedure Details - Skin Biopsy:     Body area: anogenital    Anogenital location: vulva    Initial size (mm): 7    Final defect size (mm): 7    Malignancy: malignancy unknown      Destruction method: shave biopsy      Comments:   Pt tolerated well aftercare instructions provided area cleaned dressed          Assessment / Plan      Assessment/Plan:   Diagnoses and all orders for this visit:    1. " Vaginal lesion (Primary)  -     traMADol (ULTRAM) 50 MG tablet; Take 1 tablet by mouth Every 6 (Six) Hours As Needed for Moderate Pain .  Dispense: 60 tablet; Refill: 0  -     Biopsy    2. Medication management  -     Urine Drug Screen - Urine, Clean Catch; Future  -     Urine Drug Screen - Urine, Clean Catch    3. Vaginal pain  -     traMADol (ULTRAM) 50 MG tablet; Take 1 tablet by mouth Every 6 (Six) Hours As Needed for Moderate Pain .  Dispense: 60 tablet; Refill: 0       Lesion removed per shave biopsy patient tolerated well specimen sent for pathology tramadol provided for pain recommend Tylenol prior to taking tramadol ice for comfort keep area clean dry and covered until healed we will call with results and further recommendations      Follow Up:   Return if symptoms worsen or fail to improve.    RODGER Gill      Please note that portions of this note were completed with a voice recognition program.

## 2021-11-02 ENCOUNTER — TELEPHONE (OUTPATIENT)
Dept: FAMILY MEDICINE CLINIC | Facility: CLINIC | Age: 56
End: 2021-11-02

## 2021-11-02 LAB
CYTO UR: NORMAL
LAB AP CASE REPORT: NORMAL
LAB AP CLINICAL INFORMATION: NORMAL
PATH REPORT.FINAL DX SPEC: NORMAL
PATH REPORT.GROSS SPEC: NORMAL

## 2021-11-02 NOTE — TELEPHONE ENCOUNTER
----- Message from RODGER Joyner sent at 10/29/2021  2:34 PM EDT -----   NEGATIVE FOR INTRAEPITHELIAL LESION OR MALIGNANCY

## 2021-11-04 DIAGNOSIS — D07.1 VULVAR INTRAEPITHELIAL NEOPLASIA GRADE 3: Primary | ICD-10-CM

## 2021-11-24 ENCOUNTER — TELEPHONE (OUTPATIENT)
Dept: URGENT CARE | Facility: CLINIC | Age: 56
End: 2021-11-24

## 2021-11-24 PROCEDURE — U0003 INFECTIOUS AGENT DETECTION BY NUCLEIC ACID (DNA OR RNA); SEVERE ACUTE RESPIRATORY SYNDROME CORONAVIRUS 2 (SARS-COV-2) (CORONAVIRUS DISEASE [COVID-19]), AMPLIFIED PROBE TECHNIQUE, MAKING USE OF HIGH THROUGHPUT TECHNOLOGIES AS DESCRIBED BY CMS-2020-01-R: HCPCS | Performed by: FAMILY MEDICINE

## 2021-11-24 NOTE — TELEPHONE ENCOUNTER
----- Message from RODGER Chavarria sent at 11/24/2021  3:16 PM EST -----  Please call the patient regarding her negative result.

## 2021-12-09 ENCOUNTER — OFFICE VISIT (OUTPATIENT)
Dept: FAMILY MEDICINE CLINIC | Facility: CLINIC | Age: 56
End: 2021-12-09

## 2021-12-09 ENCOUNTER — HOSPITAL ENCOUNTER (OUTPATIENT)
Dept: MAMMOGRAPHY | Facility: HOSPITAL | Age: 56
Discharge: HOME | End: 2021-12-09
Admitting: NURSE PRACTITIONER

## 2021-12-09 VITALS
HEART RATE: 87 BPM | OXYGEN SATURATION: 97 % | SYSTOLIC BLOOD PRESSURE: 127 MMHG | BODY MASS INDEX: 31.15 KG/M2 | TEMPERATURE: 99.1 F | DIASTOLIC BLOOD PRESSURE: 92 MMHG | WEIGHT: 165 LBS | HEIGHT: 61 IN

## 2021-12-09 DIAGNOSIS — Z12.31 SCREENING MAMMOGRAM, ENCOUNTER FOR: ICD-10-CM

## 2021-12-09 DIAGNOSIS — J01.40 ACUTE NON-RECURRENT PANSINUSITIS: Primary | ICD-10-CM

## 2021-12-09 PROCEDURE — 77067 SCR MAMMO BI INCL CAD: CPT

## 2021-12-09 PROCEDURE — 99213 OFFICE O/P EST LOW 20 MIN: CPT | Performed by: NURSE PRACTITIONER

## 2021-12-09 PROCEDURE — 77063 BREAST TOMOSYNTHESIS BI: CPT

## 2021-12-09 RX ORDER — AMOXICILLIN AND CLAVULANATE POTASSIUM 875; 125 MG/1; MG/1
1 TABLET, FILM COATED ORAL 2 TIMES DAILY
Qty: 20 TABLET | Refills: 0 | Status: SHIPPED | OUTPATIENT
Start: 2021-12-09 | End: 2021-12-19

## 2021-12-09 RX ORDER — METHYLPREDNISOLONE 4 MG/1
TABLET ORAL
Qty: 21 EACH | Refills: 0 | Status: SHIPPED | OUTPATIENT
Start: 2021-12-09 | End: 2022-02-22

## 2021-12-09 RX ORDER — FLUTICASONE PROPIONATE 50 MCG
2 SPRAY, SUSPENSION (ML) NASAL DAILY
COMMUNITY
End: 2022-02-22 | Stop reason: SDUPTHER

## 2021-12-09 NOTE — PATIENT INSTRUCTIONS
Sinusitis, Adult  Sinusitis is soreness and swelling (inflammation) of your sinuses. Sinuses are hollow spaces in the bones around your face. They are located:  · Around your eyes.  · In the middle of your forehead.  · Behind your nose.  · In your cheekbones.  Your sinuses and nasal passages are lined with a fluid called mucus. Mucus drains out of your sinuses. Swelling can trap mucus in your sinuses. This lets germs (bacteria, virus, or fungus) grow, which leads to infection. Most of the time, this condition is caused by a virus.  What are the causes?  This condition is caused by:  · Allergies.  · Asthma.  · Germs.  · Things that block your nose or sinuses.  · Growths in the nose (nasal polyps).  · Chemicals or irritants in the air.  · Fungus (rare).  What increases the risk?  You are more likely to develop this condition if:  · You have a weak body defense system (immune system).  · You do a lot of swimming or diving.  · You use nasal sprays too much.  · You smoke.  What are the signs or symptoms?  The main symptoms of this condition are pain and a feeling of pressure around the sinuses. Other symptoms include:  · Stuffy nose (congestion).  · Runny nose (drainage).  · Swelling and warmth in the sinuses.  · Headache.  · Toothache.  · A cough that may get worse at night.  · Mucus that collects in the throat or the back of the nose (postnasal drip).  · Being unable to smell and taste.  · Being very tired (fatigue).  · A fever.  · Sore throat.  · Bad breath.  How is this diagnosed?  This condition is diagnosed based on:  · Your symptoms.  · Your medical history.  · A physical exam.  · Tests to find out if your condition is short-term (acute) or long-term (chronic). Your doctor may:  ? Check your nose for growths (polyps).  ? Check your sinuses using a tool that has a light (endoscope).  ? Check for allergies or germs.  ? Do imaging tests, such as an MRI or CT scan.  How is this treated?  Treatment for this condition  depends on the cause and whether it is short-term or long-term.  · If caused by a virus, your symptoms should go away on their own within 10 days. You may be given medicines to relieve symptoms. They include:  ? Medicines that shrink swollen tissue in the nose.  ? Medicines that treat allergies (antihistamines).  ? A spray that treats swelling of the nostrils.   ? Rinses that help get rid of thick mucus in your nose (nasal saline washes).  · If caused by bacteria, your doctor may wait to see if you will get better without treatment. You may be given antibiotic medicine if you have:  ? A very bad infection.  ? A weak body defense system.  · If caused by growths in the nose, you may need to have surgery.  Follow these instructions at home:  Medicines  · Take, use, or apply over-the-counter and prescription medicines only as told by your doctor. These may include nasal sprays.  · If you were prescribed an antibiotic medicine, take it as told by your doctor. Do not stop taking the antibiotic even if you start to feel better.  Hydrate and humidify    · Drink enough water to keep your pee (urine) pale yellow.  · Use a cool mist humidifier to keep the humidity level in your home above 50%.  · Breathe in steam for 10-15 minutes, 3-4 times a day, or as told by your doctor. You can do this in the bathroom while a hot shower is running.  · Try not to spend time in cool or dry air.    Rest  · Rest as much as you can.  · Sleep with your head raised (elevated).  · Make sure you get enough sleep each night.  General instructions    · Put a warm, moist washcloth on your face 3-4 times a day, or as often as told by your doctor. This will help with discomfort.  · Wash your hands often with soap and water. If there is no soap and water, use hand .  · Do not smoke. Avoid being around people who are smoking (secondhand smoke).  · Keep all follow-up visits as told by your doctor. This is important.    Contact a doctor if:  · You  have a fever.  · Your symptoms get worse.  · Your symptoms do not get better within 10 days.  Get help right away if:  · You have a very bad headache.  · You cannot stop throwing up (vomiting).  · You have very bad pain or swelling around your face or eyes.  · You have trouble seeing.  · You feel confused.  · Your neck is stiff.  · You have trouble breathing.  Summary  · Sinusitis is swelling of your sinuses. Sinuses are hollow spaces in the bones around your face.  · This condition is caused by tissues in your nose that become inflamed or swollen. This traps germs. These can lead to infection.  · If you were prescribed an antibiotic medicine, take it as told by your doctor. Do not stop taking it even if you start to feel better.  · Keep all follow-up visits as told by your doctor. This is important.  This information is not intended to replace advice given to you by your health care provider. Make sure you discuss any questions you have with your health care provider.  Document Revised: 05/20/2019 Document Reviewed: 05/20/2019  [employer] Patient Education © 2021 [employer]

## 2021-12-09 NOTE — PROGRESS NOTES
Chief Complaint  Nasal Congestion (started over the weekend ), Headache, and Sinus Problem    Subjective          Luis Masterson is a 56 y.o. female who presents to [facility]    History of Present Illness    Answers for HPI/ROS submitted by the patient on 12/7/2021  Please describe your symptoms.: Sinus infection  Have you had these symptoms before?: Yes  How long have you been having these symptoms?: 1-4 days  Please list any medications you are currently taking for this condition.: Over counter sinus med  What is the primary reason for your visit?: Other    Onset over the weekend.  Patient reports having negative Covid test.  Patient admits to facial pressure denies any dental pain.  Patient has postnasal drainage.  Patient has taken over-the-counter sinus medication with minimal relief.     Review of Systems   Constitutional: Negative for fever.   HENT: Positive for postnasal drip, sinus pressure and sinus pain.    Musculoskeletal: Negative for myalgias.          Medical History: has a past medical history of Kidney stones and Night sweats.     Surgical History: has a past surgical history that includes Carpal tunnel release; Cholecystectomy; Endometrial ablation; Kidney stone surgery; Mastectomy (Bilateral); and Thoracic outlet surgery.     Family History: family history includes Breast cancer in an other family member; Diabetes in an other family member; Heart disease in an other family member; Stroke in an other family member.     Social History: reports that she has quit smoking. She has never used smokeless tobacco. She reports that she does not drink alcohol and does not use drugs.    Allergies: Sulfa antibiotics      Health Maintenance Due   Topic Date Due   • COLORECTAL CANCER SCREENING  Never done   • ANNUAL PHYSICAL  Never done   • TDAP/TD VACCINES (1 - Tdap) Never done   • ZOSTER VACCINE (1 of 2) Never done   • HEPATITIS C SCREENING  Never done   • COVID-19 Vaccine (2  - Booster for Eleuterio series) 10/06/2021            Current Outpatient Medications:   •  acetaminophen (Tylenol) 325 MG tablet, Take 2 tablets by mouth every 6 hours as needed for pain., Disp: 30 tablet, Rfl: 0  •  cloNIDine (Catapres) 0.1 MG tablet, Take 1 tablet by mouth Every Night., Disp: 90 tablet, Rfl: 1  •  fluticasone (FLONASE) 50 MCG/ACT nasal spray, 2 sprays into the nostril(s) as directed by provider Daily., Disp: , Rfl:   •  ibuprofen (ADVIL,MOTRIN) 600 MG tablet, Take one tablet by mouth every 6 hours as needed for pain, Disp: 30 tablet, Rfl: 0  •  Insulin Pen Needle (BD Pen Needle Lillie 2nd Gen) 32G X 4 MM misc, 1 each Daily., Disp: 100 each, Rfl: 2  •  Lidocaine Viscous HCl (XYLOCAINE) 2 % solution, use 10 ml topically every 4 hours as needed for pain, Disp: 100 mL, Rfl: 0  •  Liraglutide (SAXENDA) 18 MG/3ML injection pen, Inject 3 mg under the skin into the appropriate area as directed Daily., Disp: 5 pen, Rfl: 5  •  nystatin-triamcinolone (MYCOLOG) 241149-6.1 UNIT/GM-% ointment, Apply 1 application topically to the appropriate area as directed 2 (Two) Times a Day., Disp: 60 g, Rfl: 1  •  ondansetron (ZOFRAN) 4 MG tablet, Take one tablet by mouth every 8 hours, Disp: 6 tablet, Rfl: 0  •  sennosides-docusate (PERICOLACE) 8.6-50 MG per tablet, Take one tablet by mouth twice daily, Disp: 60 tablet, Rfl: 0  •  traMADol (ULTRAM) 50 MG tablet, Take 1 tablet by mouth Every 6 (Six) Hours As Needed for Moderate Pain ., Disp: 60 tablet, Rfl: 0  •  venlafaxine XR (Effexor XR) 37.5 MG 24 hr capsule, Take 1 capsule by mouth Daily., Disp: 90 capsule, Rfl: 1  •  amoxicillin-clavulanate (Augmentin) 875-125 MG per tablet, Take 1 tablet by mouth 2 (Two) Times a Day for 10 days., Disp: 20 tablet, Rfl: 0  •  methylPREDNISolone (MEDROL) 4 MG dose pack, Take as directed on package instructions., Disp: 1 each, Rfl: 0      Immunization History   Administered Date(s) Administered   • COVID-19 (ELEUTERIO) 08/11/2021  "        Objective       Vitals:    12/09/21 1030   BP: 127/92   BP Location: Right arm   Patient Position: Sitting   Cuff Size: Adult   Pulse: 87   Temp: 99.1 °F (37.3 °C)   TempSrc: Temporal   SpO2: 97%   Weight: 74.8 kg (165 lb)   Height: 154.9 cm (60.98\")      Body mass index is 31.19 kg/m².   Wt Readings from Last 3 Encounters:   12/09/21 74.8 kg (165 lb)   10/29/21 77.1 kg (170 lb)   10/27/21 77.1 kg (170 lb)      BP Readings from Last 3 Encounters:   12/09/21 127/92   10/29/21 120/81   10/27/21 129/84        Physical Exam  Constitutional:       Appearance: She is well-developed. She is not ill-appearing or toxic-appearing.   HENT:      Head: Normocephalic and atraumatic.      Right Ear: Hearing, tympanic membrane, ear canal and external ear normal.      Left Ear: Hearing, tympanic membrane, ear canal and external ear normal.      Nose: No nasal deformity, mucosal edema, congestion or rhinorrhea.      Right Sinus: Maxillary sinus tenderness and frontal sinus tenderness present.      Left Sinus: Maxillary sinus tenderness and frontal sinus tenderness present.      Mouth/Throat:      Dentition: Normal dentition.      Pharynx: Posterior oropharyngeal erythema (mild generalized) present. No oropharyngeal exudate.      Tonsils: No tonsillar abscesses.   Cardiovascular:      Rate and Rhythm: Normal rate and regular rhythm.   Pulmonary:      Effort: No tachypnea, bradypnea or respiratory distress.      Breath sounds: Decreased breath sounds (slight throughout) present. No wheezing or rhonchi.         Result Review :              Assessment and Plan        Diagnoses and all orders for this visit:    1. Acute non-recurrent pansinusitis (Primary)  -     amoxicillin-clavulanate (Augmentin) 875-125 MG per tablet; Take 1 tablet by mouth 2 (Two) Times a Day for 10 days.  Dispense: 20 tablet; Refill: 0  -     methylPREDNISolone (MEDROL) 4 MG dose pack; Take as directed on package instructions.  Dispense: 1 each; Refill: " 0    rest push fluids, continue otc med for symptom relief.       Follow Up     Return if symptoms worsen or fail to improve.    Patient was given instructions and counseling regarding her condition or for health maintenance advice. Please see specific information pulled into the AVS if appropriate.     RODGER Jacobs

## 2022-02-22 ENCOUNTER — OFFICE VISIT (OUTPATIENT)
Dept: FAMILY MEDICINE CLINIC | Facility: CLINIC | Age: 57
End: 2022-02-22

## 2022-02-22 VITALS
DIASTOLIC BLOOD PRESSURE: 82 MMHG | WEIGHT: 170 LBS | BODY MASS INDEX: 32.1 KG/M2 | OXYGEN SATURATION: 99 % | HEART RATE: 70 BPM | SYSTOLIC BLOOD PRESSURE: 150 MMHG | HEIGHT: 61 IN | TEMPERATURE: 96.7 F

## 2022-02-22 DIAGNOSIS — R61 NIGHT SWEATS: ICD-10-CM

## 2022-02-22 DIAGNOSIS — R23.2 HOT FLASHES: ICD-10-CM

## 2022-02-22 DIAGNOSIS — E66.9 OBESITY (BMI 30.0-34.9): ICD-10-CM

## 2022-02-22 DIAGNOSIS — J30.2 SEASONAL ALLERGIES: ICD-10-CM

## 2022-02-22 DIAGNOSIS — R03.0 ELEVATED BLOOD PRESSURE READING: ICD-10-CM

## 2022-02-22 DIAGNOSIS — E78.2 MIXED HYPERLIPIDEMIA: Primary | ICD-10-CM

## 2022-02-22 DIAGNOSIS — R74.8 ELEVATED LIVER ENZYMES: ICD-10-CM

## 2022-02-22 DIAGNOSIS — K59.00 CONSTIPATION, UNSPECIFIED CONSTIPATION TYPE: ICD-10-CM

## 2022-02-22 DIAGNOSIS — R73.01 IMPAIRED FASTING GLUCOSE: ICD-10-CM

## 2022-02-22 DIAGNOSIS — F41.9 ANXIETY: ICD-10-CM

## 2022-02-22 LAB
ALBUMIN SERPL-MCNC: 4.2 G/DL (ref 3.5–5.2)
ALBUMIN/GLOB SERPL: 1.4 G/DL
ALP SERPL-CCNC: 59 U/L (ref 39–117)
ALT SERPL W P-5'-P-CCNC: 45 U/L (ref 1–33)
ANION GAP SERPL CALCULATED.3IONS-SCNC: 11.6 MMOL/L (ref 5–15)
AST SERPL-CCNC: 25 U/L (ref 1–32)
BASOPHILS # BLD AUTO: 0.06 10*3/MM3 (ref 0–0.2)
BASOPHILS NFR BLD AUTO: 1.1 % (ref 0–1.5)
BILIRUB SERPL-MCNC: 0.5 MG/DL (ref 0–1.2)
BUN SERPL-MCNC: 10 MG/DL (ref 6–20)
BUN/CREAT SERPL: 11.2 (ref 7–25)
CALCIUM SPEC-SCNC: 9.7 MG/DL (ref 8.6–10.5)
CHLORIDE SERPL-SCNC: 101 MMOL/L (ref 98–107)
CHOLEST SERPL-MCNC: 228 MG/DL (ref 0–200)
CO2 SERPL-SCNC: 25.4 MMOL/L (ref 22–29)
CREAT SERPL-MCNC: 0.89 MG/DL (ref 0.57–1)
DEPRECATED RDW RBC AUTO: 38.5 FL (ref 37–54)
EOSINOPHIL # BLD AUTO: 0.15 10*3/MM3 (ref 0–0.4)
EOSINOPHIL NFR BLD AUTO: 2.8 % (ref 0.3–6.2)
ERYTHROCYTE [DISTWIDTH] IN BLOOD BY AUTOMATED COUNT: 11.8 % (ref 12.3–15.4)
GFR SERPL CREATININE-BSD FRML MDRD: 66 ML/MIN/1.73
GLOBULIN UR ELPH-MCNC: 2.9 GM/DL
GLUCOSE SERPL-MCNC: 89 MG/DL (ref 65–99)
HBA1C MFR BLD: 5.3 % (ref 4.8–5.6)
HCT VFR BLD AUTO: 41.1 % (ref 34–46.6)
HDLC SERPL-MCNC: 48 MG/DL (ref 40–60)
HGB BLD-MCNC: 13.8 G/DL (ref 12–15.9)
IMM GRANULOCYTES # BLD AUTO: 0.01 10*3/MM3 (ref 0–0.05)
IMM GRANULOCYTES NFR BLD AUTO: 0.2 % (ref 0–0.5)
LDLC SERPL CALC-MCNC: 154 MG/DL (ref 0–100)
LDLC/HDLC SERPL: 3.15 {RATIO}
LYMPHOCYTES # BLD AUTO: 2.24 10*3/MM3 (ref 0.7–3.1)
LYMPHOCYTES NFR BLD AUTO: 41.2 % (ref 19.6–45.3)
MCH RBC QN AUTO: 30.4 PG (ref 26.6–33)
MCHC RBC AUTO-ENTMCNC: 33.6 G/DL (ref 31.5–35.7)
MCV RBC AUTO: 90.5 FL (ref 79–97)
MONOCYTES # BLD AUTO: 0.63 10*3/MM3 (ref 0.1–0.9)
MONOCYTES NFR BLD AUTO: 11.6 % (ref 5–12)
NEUTROPHILS NFR BLD AUTO: 2.35 10*3/MM3 (ref 1.7–7)
NEUTROPHILS NFR BLD AUTO: 43.1 % (ref 42.7–76)
NRBC BLD AUTO-RTO: 0 /100 WBC (ref 0–0.2)
PLATELET # BLD AUTO: 314 10*3/MM3 (ref 140–450)
PMV BLD AUTO: 10.5 FL (ref 6–12)
POTASSIUM SERPL-SCNC: 4.2 MMOL/L (ref 3.5–5.2)
PROT SERPL-MCNC: 7.1 G/DL (ref 6–8.5)
RBC # BLD AUTO: 4.54 10*6/MM3 (ref 3.77–5.28)
SODIUM SERPL-SCNC: 138 MMOL/L (ref 136–145)
TRIGL SERPL-MCNC: 145 MG/DL (ref 0–150)
TSH SERPL DL<=0.05 MIU/L-ACNC: 3.16 UIU/ML (ref 0.27–4.2)
VLDLC SERPL-MCNC: 26 MG/DL (ref 5–40)
WBC NRBC COR # BLD: 5.44 10*3/MM3 (ref 3.4–10.8)

## 2022-02-22 PROCEDURE — 99214 OFFICE O/P EST MOD 30 MIN: CPT | Performed by: NURSE PRACTITIONER

## 2022-02-22 PROCEDURE — 96372 THER/PROPH/DIAG INJ SC/IM: CPT | Performed by: NURSE PRACTITIONER

## 2022-02-22 PROCEDURE — 83036 HEMOGLOBIN GLYCOSYLATED A1C: CPT | Performed by: NURSE PRACTITIONER

## 2022-02-22 PROCEDURE — 80050 GENERAL HEALTH PANEL: CPT | Performed by: NURSE PRACTITIONER

## 2022-02-22 PROCEDURE — 80061 LIPID PANEL: CPT | Performed by: NURSE PRACTITIONER

## 2022-02-22 PROCEDURE — 36415 COLL VENOUS BLD VENIPUNCTURE: CPT | Performed by: NURSE PRACTITIONER

## 2022-02-22 RX ORDER — METHYLPREDNISOLONE ACETATE 80 MG/ML
80 INJECTION, SUSPENSION INTRA-ARTICULAR; INTRALESIONAL; INTRAMUSCULAR; SOFT TISSUE ONCE
Status: COMPLETED | OUTPATIENT
Start: 2022-02-22 | End: 2022-02-22

## 2022-02-22 RX ORDER — FLUTICASONE PROPIONATE 50 MCG
2 SPRAY, SUSPENSION (ML) NASAL DAILY
Qty: 16 G | Refills: 5 | Status: SHIPPED | OUTPATIENT
Start: 2022-02-22 | End: 2022-08-23 | Stop reason: SDUPTHER

## 2022-02-22 RX ORDER — DOCUSATE SODIUM 100 MG/1
100 CAPSULE, LIQUID FILLED ORAL 2 TIMES DAILY PRN
Qty: 180 CAPSULE | Refills: 1 | Status: SHIPPED | OUTPATIENT
Start: 2022-02-22 | End: 2023-02-24 | Stop reason: SDUPTHER

## 2022-02-22 RX ORDER — VENLAFAXINE HYDROCHLORIDE 75 MG/1
75 CAPSULE, EXTENDED RELEASE ORAL DAILY
Qty: 90 CAPSULE | Refills: 1 | Status: SHIPPED | OUTPATIENT
Start: 2022-02-22 | End: 2022-08-23 | Stop reason: SDUPTHER

## 2022-02-22 RX ORDER — AMOXICILLIN 500 MG/1
500 TABLET, FILM COATED ORAL 3 TIMES DAILY
Qty: 30 TABLET | Refills: 0 | Status: SHIPPED | OUTPATIENT
Start: 2022-02-22 | End: 2022-03-04

## 2022-02-22 RX ADMIN — METHYLPREDNISOLONE ACETATE 80 MG: 80 INJECTION, SUSPENSION INTRA-ARTICULAR; INTRALESIONAL; INTRAMUSCULAR; SOFT TISSUE at 08:36

## 2022-02-22 NOTE — PROGRESS NOTES
Follow Up Office Visit      Patient Name: Luis Masterson  : 1965   MRN: [id number]     Chief Complaint:    Chief Complaint   Patient presents with   • Allergic Rhinitis   • Anxiety   • Hyperlipidemia   • Hot Flashes   • Night Sweats       History of Present Illness: Luis Masterson is a 56 y.o. female who is here today to follow up for hyperlipidemia, anxiety, elevated liver enzymes, obesity, hot flashes, night sweats and seasonal allergies    Pt would like to increase effexor dose for anxiety hot flashes and night sweats improved    Also complain of ear pain postnasal drip sinus pressure sinus pain eye itching headaches for several days taking Zyrtec      11/10/21Tomonika, Paola MARTIN MD    vuvla neoplasia grade 3  surgical removal   Next follow up Appt. made for 3/14/22 at 9:30 in office          Pap-  Mammogram   Colonoscopy none states she will scheduled once healed from surgery     Subjective      Review of Systems:   Review of Systems   Constitutional: Negative for chills and fever.   HENT: Positive for ear pain, postnasal drip, rhinorrhea, sinus pressure and sinus pain.    Eyes: Positive for itching.   Respiratory: Negative for cough.    Cardiovascular: Negative for chest pain.   Gastrointestinal: Negative for abdominal pain, diarrhea, nausea and vomiting.   Genitourinary: Negative for dysuria.   Allergic/Immunologic: Positive for environmental allergies.   Neurological: Positive for headaches.   Psychiatric/Behavioral: The patient is nervous/anxious.         Past Medical History:   Past Medical History:   Diagnosis Date   • Kidney stones    • Night sweats        Past Surgical History:   Past Surgical History:   Procedure Laterality Date   • CARPAL TUNNEL RELEASE     • CHOLECYSTECTOMY     • ENDOMETRIAL ABLATION     • KIDNEY STONE SURGERY     • MASTECTOMY Bilateral    • THORACIC OUTLET SURGERY         Family History:   Family History   Problem Relation Age of Onset   • Breast cancer Other    •  "Stroke Other    • Heart disease Other    • Diabetes Other        Social History:   Social History     Socioeconomic History   • Marital status:    Tobacco Use   • Smoking status: Former Smoker   • Smokeless tobacco: Never Used   Vaping Use   • Vaping Use: Never used   Substance and Sexual Activity   • Alcohol use: Never   • Drug use: Never   • Sexual activity: Defer       Medications:     Current Outpatient Medications:   •  amoxicillin (AMOXIL) 500 MG tablet, Take 1 tablet by mouth 3 (Three) Times a Day for 10 days., Disp: 30 tablet, Rfl: 0  •  Cetirizine HCl 10 MG capsule, Take 10 mg by mouth every night at bedtime., Disp: 90 capsule, Rfl: 1  •  docusate sodium (Colace) 100 MG capsule, Take 1 capsule by mouth 2 (Two) Times a Day As Needed for Constipation., Disp: 180 capsule, Rfl: 1  •  fluticasone (FLONASE) 50 MCG/ACT nasal spray, 2 sprays into the nostril(s) as directed by provider Daily., Disp: 16 g, Rfl: 5  •  Insulin Pen Needle (BD Pen Needle Lillie 2nd Gen) 32G X 4 MM misc, 1 each Daily., Disp: 100 each, Rfl: 2  •  Liraglutide (SAXENDA) 18 MG/3ML injection pen, Inject 3 mg under the skin into the appropriate area as directed Daily., Disp: 5 pen, Rfl: 5  •  venlafaxine XR (Effexor XR) 75 MG 24 hr capsule, Take 1 capsule by mouth Daily., Disp: 90 capsule, Rfl: 1  No current facility-administered medications for this visit.    Allergies:   Allergies   Allergen Reactions   • Sulfa Antibiotics Swelling           Objective     Physical Exam:  Vital Signs:   Vitals:    02/22/22 0751 02/22/22 0833   BP: 148/87 150/82   Pulse: 70    Temp: 96.7 °F (35.9 °C)    SpO2: 99%    Weight: 77.1 kg (170 lb)    Height: 154.9 cm (60.98\")      Body mass index is 32.14 kg/m².     Physical Exam  HENT:      Right Ear: Tympanic membrane is injected.      Left Ear: Tympanic membrane is injected.      Nose: Congestion and rhinorrhea present.      Right Turbinates: Enlarged and swollen.      Left Turbinates: Enlarged and swollen.    "   Right Sinus: Maxillary sinus tenderness present.      Left Sinus: Maxillary sinus tenderness present.   Eyes:      Conjunctiva/sclera:      Right eye: Right conjunctiva is injected.      Left eye: Left conjunctiva is injected.   Cardiovascular:      Rate and Rhythm: Normal rate and regular rhythm.      Heart sounds: Normal heart sounds. No murmur heard.      Pulmonary:      Effort: Pulmonary effort is normal.      Breath sounds: Normal breath sounds.   Abdominal:      General: Bowel sounds are normal.      Palpations: Abdomen is soft.   Musculoskeletal:      Right lower leg: No edema.      Left lower leg: No edema.   Lymphadenopathy:      Cervical: No cervical adenopathy.   Skin:     General: Skin is warm and dry.   Neurological:      Mental Status: She is alert.   Psychiatric:         Mood and Affect: Mood normal.         Behavior: Behavior normal.         Thought Content: Thought content normal.         Judgment: Judgment normal.             Assessment / Plan      Assessment/Plan:   Diagnoses and all orders for this visit:    1. Mixed hyperlipidemia (Primary)  -     Lipid Panel    2. Obesity (BMI 30.0-34.9)    3. Seasonal allergies  -     CBC Auto Differential  -     methylPREDNISolone acetate (DEPO-medrol) injection 80 mg    4. Hot flashes    5. Night sweats    6. Anxiety  -     TSH    7. Constipation, unspecified constipation type    8. Elevated liver enzymes    9. Impaired fasting glucose  -     Comprehensive Metabolic Panel  -     Hemoglobin A1c    10. Elevated blood pressure reading    Other orders  -     fluticasone (FLONASE) 50 MCG/ACT nasal spray; 2 sprays into the nostril(s) as directed by provider Daily.  Dispense: 16 g; Refill: 5  -     venlafaxine XR (Effexor XR) 75 MG 24 hr capsule; Take 1 capsule by mouth Daily.  Dispense: 90 capsule; Refill: 1  -     Liraglutide (SAXENDA) 18 MG/3ML injection pen; Inject 3 mg under the skin into the appropriate area as directed Daily.  Dispense: 5 pen; Refill: 5  -  "    amoxicillin (AMOXIL) 500 MG tablet; Take 1 tablet by mouth 3 (Three) Times a Day for 10 days.  Dispense: 30 tablet; Refill: 0  -     Cetirizine HCl 10 MG capsule; Take 10 mg by mouth every night at bedtime.  Dispense: 90 capsule; Refill: 1  -     docusate sodium (Colace) 100 MG capsule; Take 1 capsule by mouth 2 (Two) Times a Day As Needed for Constipation.  Dispense: 180 capsule; Refill: 1       Hyperlipidemia we will obtain lipid panel to monitor   Obesity will continue saxenda patient reports she did take a break after she had a vaginal lesion as she was off from work for 6 weeks will resume activity will resume  Elevated blood pressure remains elevated upon manual recheck come back in 1 week decrease salt intake increase water intake if remains elevated will start lisinopril hydrochlorothiazide at that time  Elevated liver enzymes will obtain CMP to monitor  Impaired fasting glucose obtain hemoglobin A1c to monitor recommend decrease carb intake increase exercise 30 minutes daily  Constipation will provide prescription for Colace 2 tablets daily increase water intake to a minimum 80 ounces daily  Anxiety will increase Effexor to 75 mg daily hot flashes and night sweats are currently controlled      Follow Up:   Return in about 6 months (around 8/22/2022).    Melinda Torres, RODGER    \"Please note that portions of this note were completed with a voice recognition program.\"    "

## 2022-03-02 ENCOUNTER — TELEPHONE (OUTPATIENT)
Dept: FAMILY MEDICINE CLINIC | Facility: CLINIC | Age: 57
End: 2022-03-02

## 2022-03-02 NOTE — TELEPHONE ENCOUNTER
Caller: DINA    Relationship to patient: Other    Best call back number: [phone]    Patient is needing: [facility] CALLED STATING THE PATIENT'S Liraglutide (SAXENDA) 18 MG/3ML injection pen AUTHORIZATION WAS APPROVED AND THE OFFICE SHOULD BE GETTING A FAX REGARDING IT.

## 2022-04-27 RX ORDER — VALACYCLOVIR HYDROCHLORIDE 1 G/1
1000 TABLET, FILM COATED ORAL 2 TIMES DAILY
Qty: 60 TABLET | Refills: 2 | Status: SHIPPED | OUTPATIENT
Start: 2022-04-27

## 2022-05-23 RX ORDER — LIRAGLUTIDE 6 MG/ML
3 INJECTION, SOLUTION SUBCUTANEOUS DAILY
Qty: 5 PEN | Refills: 5 | Status: CANCELLED | OUTPATIENT
Start: 2022-05-23

## 2022-05-24 RX ORDER — LIRAGLUTIDE 6 MG/ML
3 INJECTION, SOLUTION SUBCUTANEOUS DAILY
Qty: 5 PEN | Refills: 5 | Status: SHIPPED | OUTPATIENT
Start: 2022-05-24 | End: 2023-02-24

## 2022-07-20 ENCOUNTER — TELEPHONE (OUTPATIENT)
Dept: FAMILY MEDICINE CLINIC | Facility: CLINIC | Age: 57
End: 2022-07-20

## 2022-07-22 NOTE — TELEPHONE ENCOUNTER
Caller: [facility] - [facility]    Best call back number: [phone] - CASE ID # [id number]    What was the call regarding: PRIOR AUTHORIZATION ADDITIONAL INFORMATION FAX THAT WAS SENT ON 07/19 AND 07/20    Do you require a callback: NO, IF WE CAN FILL OUT FORM AND FAX BACK -[phone]

## 2022-08-02 ENCOUNTER — OFFICE VISIT (OUTPATIENT)
Dept: FAMILY MEDICINE CLINIC | Facility: CLINIC | Age: 57
End: 2022-08-02

## 2022-08-02 ENCOUNTER — HOSPITAL ENCOUNTER (OUTPATIENT)
Dept: GENERAL RADIOLOGY | Facility: HOSPITAL | Age: 57
Discharge: HOME | End: 2022-08-02
Admitting: NURSE PRACTITIONER

## 2022-08-02 VITALS
TEMPERATURE: 98.9 F | SYSTOLIC BLOOD PRESSURE: 139 MMHG | OXYGEN SATURATION: 98 % | BODY MASS INDEX: 31.91 KG/M2 | DIASTOLIC BLOOD PRESSURE: 83 MMHG | WEIGHT: 169 LBS | HEART RATE: 80 BPM | HEIGHT: 61 IN

## 2022-08-02 DIAGNOSIS — M25.562 ACUTE PAIN OF LEFT KNEE: Primary | ICD-10-CM

## 2022-08-02 DIAGNOSIS — M25.462 SWELLING OF KNEE JOINT, LEFT: ICD-10-CM

## 2022-08-02 DIAGNOSIS — R93.6 ABNORMAL X-RAY OF KNEE: ICD-10-CM

## 2022-08-02 DIAGNOSIS — M25.562 ACUTE PAIN OF LEFT KNEE: ICD-10-CM

## 2022-08-02 PROCEDURE — 20610 DRAIN/INJ JOINT/BURSA W/O US: CPT | Performed by: NURSE PRACTITIONER

## 2022-08-02 PROCEDURE — 99213 OFFICE O/P EST LOW 20 MIN: CPT | Performed by: NURSE PRACTITIONER

## 2022-08-02 PROCEDURE — 73562 X-RAY EXAM OF KNEE 3: CPT

## 2022-08-02 RX ORDER — METHYLPREDNISOLONE ACETATE 40 MG/ML
40 INJECTION, SUSPENSION INTRA-ARTICULAR; INTRALESIONAL; INTRAMUSCULAR; SOFT TISSUE ONCE
Status: COMPLETED | OUTPATIENT
Start: 2022-08-02 | End: 2022-08-03

## 2022-08-02 RX ORDER — TRAMADOL HYDROCHLORIDE 50 MG/1
50 TABLET ORAL EVERY 6 HOURS PRN
Qty: 60 TABLET | Refills: 0 | Status: SHIPPED | OUTPATIENT
Start: 2022-08-02 | End: 2023-02-24

## 2022-08-02 NOTE — PROGRESS NOTES
ACUTE VISIT     Patient Name: Luis Masterson  : 1965   MRN: [id number]     Chief Complaint:    Chief Complaint   Patient presents with   • Knee Pain       History of Present Illness: Luis Masterson is a 57 y.o. female who is here today for an  follow up urgent care for left leg pain.    Patient presented to the  22 after a 250lb concrete statute was dropped on her left leg in her front yard   Patient was moving a statue and it fell and hit her lower left leg.    Xrays of XR TIBIA FIBULA 2 VW LEFT    IMPRESSION:               No acute fracture or traumatic malalignment identified.         Patient states her left knee is still hurting and swelling  States she is having trouble sleeping due to the pain.  Pt reports she used old rx for tramadol for pain , out at this time       Subjective      Review of Systems:   Review of Systems   Constitutional: Negative for fever.   Respiratory: Negative for shortness of breath.    Cardiovascular: Negative for chest pain.   Musculoskeletal: Positive for arthralgias and joint swelling.        Past Medical History:   Past Medical History:   Diagnosis Date   • Kidney stones    • Night sweats        Past Surgical History:   Past Surgical History:   Procedure Laterality Date   • CARPAL TUNNEL RELEASE     • CHOLECYSTECTOMY     • ENDOMETRIAL ABLATION     • KIDNEY STONE SURGERY     • MASTECTOMY Bilateral    • THORACIC OUTLET SURGERY         Family History:   Family History   Problem Relation Age of Onset   • Breast cancer Other    • Stroke Other    • Heart disease Other    • Diabetes Other        Social History:   Social History     Socioeconomic History   • Marital status:    Tobacco Use   • Smoking status: Former Smoker   • Smokeless tobacco: Never Used   Vaping Use   • Vaping Use: Never used   Substance and Sexual Activity   • Alcohol use: Never   • Drug use: Never   • Sexual activity: Defer       Medications:     Current Outpatient Medications:   •   "Acetaminophen 325 MG capsule, 650 mg., Disp: , Rfl:   •  Cetirizine HCl 10 MG capsule, Take 10 mg by mouth every night at bedtime., Disp: 90 capsule, Rfl: 1  •  docusate sodium (Colace) 100 MG capsule, Take 1 capsule by mouth 2 (Two) Times a Day As Needed for Constipation., Disp: 180 capsule, Rfl: 1  •  fluticasone (FLONASE) 50 MCG/ACT nasal spray, 2 sprays into the nostril(s) as directed by provider Daily., Disp: 16 g, Rfl: 5  •  Insulin Pen Needle (BD Pen Needle Lillie 2nd Gen) 32G X 4 MM misc, 1 each Daily., Disp: 100 each, Rfl: 2  •  Liraglutide (Saxenda) 18 MG/3ML injection pen, Inject 3 mg under the skin into the appropriate area as directed Daily., Disp: 5 pen, Rfl: 5  •  melatonin 5 MG tablet tablet, 5 mg., Disp: , Rfl:   •  ondansetron (ZOFRAN) 4 MG tablet, 4 mg., Disp: , Rfl:   •  sennosides-docusate (PERICOLACE) 8.6-50 MG per tablet,  1 Tab, Oral, Tab, BID, # 60 Tab, 0 Refill(s), Pharmacy: [facility], 11/16/21 17:28:00 EST, [facility]IN[facility], 154.94, Disp: , Rfl:   •  valACYclovir (Valtrex) 1000 MG tablet, Take 1 tablet by mouth 2 (Two) Times a Day., Disp: 60 tablet, Rfl: 2  •  venlafaxine XR (Effexor XR) 75 MG 24 hr capsule, Take 1 capsule by mouth Daily., Disp: 90 capsule, Rfl: 1  •  traMADol (ULTRAM) 50 MG tablet, Take 1 tablet by mouth Every 6 (Six) Hours As Needed for Moderate Pain ., Disp: 60 tablet, Rfl: 0    Current Facility-Administered Medications:   •  methylPREDNISolone acetate (DEPO-medrol) injection 40 mg, 40 mg, Intra-articular, Once, Nasima Torres, APRN    Allergies:   Allergies   Allergen Reactions   • Sulfa Antibiotics Swelling         Objective     Physical Exam:  Vital Signs:   Vitals:    08/02/22 1157   BP: 139/83   Pulse: 80   Temp: 98.9 °F (37.2 °C)   SpO2: 98%   Weight: 76.7 kg (169 lb)   Height: 154.9 cm (61\")     Body mass index is 31.93 kg/m².     Physical Exam  Cardiovascular:      Rate and Rhythm: Normal rate and regular rhythm.      Heart sounds: " Normal heart sounds. No murmur heard.  Pulmonary:      Effort: Pulmonary effort is normal.      Breath sounds: Normal breath sounds.   Abdominal:      General: Bowel sounds are normal.      Palpations: Abdomen is soft.   Musculoskeletal:         General: Swelling and tenderness present.      Right lower leg: No edema.      Left lower leg: No edema.   Skin:     General: Skin is warm and dry.   Neurological:      Mental Status: She is alert.         Procedure:   Left Knee Joint Injection    Procedure Information:   Informed consent obtained. Patient was informed of possible adverse effects including but not limited to bleeding, damage to nerve, tendon or artery, increased blood sugar and increased blood pressure. Time out was performed. Patient was placed with knee in flexion at 90 degrees. Site marked inferior and lateral to patella. Betadine was swabbed at injection site per typical technique. 27 gauge, 1.5 inch needle injected into bursa, aspiration was performed and then Depo-Medrol 40 mg and 2 mL 1% lidocaine without epinephrine was slowly injected into joint space, fluid was free flowing. Needle was removed, betadine wiped off and band-aid placed to injection site.     The patient was instructed to call our office if they had any questions or concerns.          Assessment / Plan      Assessment/Plan:   Diagnoses and all orders for this visit:    1. Acute pain of left knee (Primary)  -     XR Knee 3 View Left; Future  -     methylPREDNISolone acetate (DEPO-medrol) injection 40 mg  -     traMADol (ULTRAM) 50 MG tablet; Take 1 tablet by mouth Every 6 (Six) Hours As Needed for Moderate Pain .  Dispense: 60 tablet; Refill: 0    2. Swelling of knee joint, left  -     XR Knee 3 View Left; Future  -     methylPREDNISolone acetate (DEPO-medrol) injection 40 mg  -     traMADol (ULTRAM) 50 MG tablet; Take 1 tablet by mouth Every 6 (Six) Hours As Needed for Moderate Pain .  Dispense: 60 tablet; Refill: 0         Acute pain  and swelling of left knee joint injection provided we will obtain x-ray provide tramadol for pain we will call with results and further recommendations patient tolerated injection well aftercare instructions provided      Follow Up:   Return if symptoms worsen or fail to improve.    Melinda Torres, RODGER      Please note that portions of this note were completed with a voice recognition program.

## 2022-08-03 RX ADMIN — METHYLPREDNISOLONE ACETATE 40 MG: 40 INJECTION, SUSPENSION INTRA-ARTICULAR; INTRALESIONAL; INTRAMUSCULAR; SOFT TISSUE at 10:34

## 2022-08-04 ENCOUNTER — TELEPHONE (OUTPATIENT)
Dept: FAMILY MEDICINE CLINIC | Facility: CLINIC | Age: 57
End: 2022-08-04

## 2022-08-04 NOTE — TELEPHONE ENCOUNTER
----- Message from RODGER Joyner sent at 8/2/2022  3:30 PM EDT -----               No acute osseous abnormality.  A joint effusion is present which may be related to internal derangement..       Mild tricompartmental osteoarthritic degenerative changes of the knee.    Please schedule MRI LEFT KNEE WITHOUT CONTRAST

## 2022-08-10 ENCOUNTER — HOSPITAL ENCOUNTER (OUTPATIENT)
Dept: MRI IMAGING | Facility: HOSPITAL | Age: 57
Discharge: HOME | End: 2022-08-10
Admitting: NURSE PRACTITIONER

## 2022-08-10 DIAGNOSIS — R93.6 ABNORMAL X-RAY OF KNEE: ICD-10-CM

## 2022-08-10 DIAGNOSIS — M25.562 ACUTE PAIN OF LEFT KNEE: ICD-10-CM

## 2022-08-10 PROCEDURE — 73721 MRI JNT OF LWR EXTRE W/O DYE: CPT

## 2022-08-11 DIAGNOSIS — M23.301 MENISCUS, LATERAL, DERANGEMENT, LEFT: Primary | ICD-10-CM

## 2022-08-22 ENCOUNTER — PREP FOR SURGERY (OUTPATIENT)
Dept: OTHER | Facility: HOSPITAL | Age: 57
End: 2022-08-22

## 2022-08-22 ENCOUNTER — OFFICE VISIT (OUTPATIENT)
Dept: ORTHOPEDIC SURGERY | Facility: CLINIC | Age: 57
End: 2022-08-22

## 2022-08-22 VITALS — HEART RATE: 79 BPM | HEIGHT: 61 IN | OXYGEN SATURATION: 98 % | BODY MASS INDEX: 31.72 KG/M2 | WEIGHT: 168 LBS

## 2022-08-22 DIAGNOSIS — S83.242A ACUTE MEDIAL MENISCUS TEAR OF LEFT KNEE, INITIAL ENCOUNTER: Primary | ICD-10-CM

## 2022-08-22 DIAGNOSIS — M23.301 MENISCUS, LATERAL, DERANGEMENT, LEFT: Primary | ICD-10-CM

## 2022-08-22 PROBLEM — M23.322 MEDIAL MENISCUS, POSTERIOR HORN DERANGEMENT, LEFT: Status: ACTIVE | Noted: 2022-08-22

## 2022-08-22 PROCEDURE — 99203 OFFICE O/P NEW LOW 30 MIN: CPT | Performed by: ORTHOPAEDIC SURGERY

## 2022-08-22 RX ORDER — CEFAZOLIN SODIUM IN 0.9 % NACL 3 G/100 ML
3 INTRAVENOUS SOLUTION, PIGGYBACK (ML) INTRAVENOUS ONCE
Status: CANCELLED | OUTPATIENT
Start: 2022-08-22 | End: 2022-08-22

## 2022-08-22 RX ORDER — CEFAZOLIN SODIUM 2 G/100ML
2 INJECTION, SOLUTION INTRAVENOUS ONCE
Status: CANCELLED | OUTPATIENT
Start: 2022-08-22 | End: 2022-08-22

## 2022-08-23 ENCOUNTER — OFFICE VISIT (OUTPATIENT)
Dept: FAMILY MEDICINE CLINIC | Facility: CLINIC | Age: 57
End: 2022-08-23

## 2022-08-23 VITALS
SYSTOLIC BLOOD PRESSURE: 140 MMHG | DIASTOLIC BLOOD PRESSURE: 77 MMHG | WEIGHT: 173 LBS | HEART RATE: 72 BPM | HEIGHT: 61 IN | OXYGEN SATURATION: 100 % | BODY MASS INDEX: 32.66 KG/M2 | TEMPERATURE: 98.7 F

## 2022-08-23 DIAGNOSIS — Z13.29 SCREENING FOR THYROID DISORDER: ICD-10-CM

## 2022-08-23 DIAGNOSIS — R73.01 IMPAIRED FASTING GLUCOSE: ICD-10-CM

## 2022-08-23 DIAGNOSIS — K76.0 FATTY LIVER: ICD-10-CM

## 2022-08-23 DIAGNOSIS — F41.9 ANXIETY: ICD-10-CM

## 2022-08-23 DIAGNOSIS — R61 NIGHT SWEATS: ICD-10-CM

## 2022-08-23 DIAGNOSIS — E78.2 MIXED HYPERLIPIDEMIA: ICD-10-CM

## 2022-08-23 DIAGNOSIS — E66.9 OBESITY (BMI 30.0-34.9): ICD-10-CM

## 2022-08-23 DIAGNOSIS — N18.9 CHRONIC KIDNEY DISEASE, UNSPECIFIED CKD STAGE: ICD-10-CM

## 2022-08-23 DIAGNOSIS — R74.8 ELEVATED LIVER ENZYMES: ICD-10-CM

## 2022-08-23 DIAGNOSIS — R23.2 HOT FLASHES: ICD-10-CM

## 2022-08-23 DIAGNOSIS — R03.0 ELEVATED BLOOD PRESSURE READING: ICD-10-CM

## 2022-08-23 DIAGNOSIS — J30.2 SEASONAL ALLERGIES: ICD-10-CM

## 2022-08-23 LAB
ALBUMIN SERPL-MCNC: 4.4 G/DL (ref 3.5–5.2)
ALBUMIN/GLOB SERPL: 2 G/DL
ALP SERPL-CCNC: 49 U/L (ref 39–117)
ALT SERPL W P-5'-P-CCNC: 23 U/L (ref 1–33)
ANION GAP SERPL CALCULATED.3IONS-SCNC: 9 MMOL/L (ref 5–15)
AST SERPL-CCNC: 16 U/L (ref 1–32)
BASOPHILS # BLD AUTO: 0.05 10*3/MM3 (ref 0–0.2)
BASOPHILS NFR BLD AUTO: 0.9 % (ref 0–1.5)
BILIRUB SERPL-MCNC: 0.5 MG/DL (ref 0–1.2)
BILIRUB UR QL STRIP: NEGATIVE
BUN SERPL-MCNC: 13 MG/DL (ref 6–20)
BUN/CREAT SERPL: 13.5 (ref 7–25)
CALCIUM SPEC-SCNC: 9.1 MG/DL (ref 8.6–10.5)
CHLORIDE SERPL-SCNC: 103 MMOL/L (ref 98–107)
CHOLEST SERPL-MCNC: 243 MG/DL (ref 0–200)
CLARITY UR: ABNORMAL
CO2 SERPL-SCNC: 26 MMOL/L (ref 22–29)
COLOR UR: YELLOW
CREAT SERPL-MCNC: 0.96 MG/DL (ref 0.57–1)
DEPRECATED RDW RBC AUTO: 39.5 FL (ref 37–54)
EGFRCR SERPLBLD CKD-EPI 2021: 70 ML/MIN/1.73
EOSINOPHIL # BLD AUTO: 0.18 10*3/MM3 (ref 0–0.4)
EOSINOPHIL NFR BLD AUTO: 3.1 % (ref 0.3–6.2)
ERYTHROCYTE [DISTWIDTH] IN BLOOD BY AUTOMATED COUNT: 12 % (ref 12.3–15.4)
GLOBULIN UR ELPH-MCNC: 2.2 GM/DL
GLUCOSE SERPL-MCNC: 106 MG/DL (ref 65–99)
GLUCOSE UR STRIP-MCNC: NEGATIVE MG/DL
HBA1C MFR BLD: 5.7 % (ref 4.8–5.6)
HCT VFR BLD AUTO: 42 % (ref 34–46.6)
HDLC SERPL-MCNC: 58 MG/DL (ref 40–60)
HGB BLD-MCNC: 14 G/DL (ref 12–15.9)
HGB UR QL STRIP.AUTO: NEGATIVE
IMM GRANULOCYTES # BLD AUTO: 0.01 10*3/MM3 (ref 0–0.05)
IMM GRANULOCYTES NFR BLD AUTO: 0.2 % (ref 0–0.5)
KETONES UR QL STRIP: NEGATIVE
LDLC SERPL CALC-MCNC: 163 MG/DL (ref 0–100)
LDLC/HDLC SERPL: 2.76 {RATIO}
LEUKOCYTE ESTERASE UR QL STRIP.AUTO: NEGATIVE
LYMPHOCYTES # BLD AUTO: 1.67 10*3/MM3 (ref 0.7–3.1)
LYMPHOCYTES NFR BLD AUTO: 28.5 % (ref 19.6–45.3)
MCH RBC QN AUTO: 30.4 PG (ref 26.6–33)
MCHC RBC AUTO-ENTMCNC: 33.3 G/DL (ref 31.5–35.7)
MCV RBC AUTO: 91.1 FL (ref 79–97)
MONOCYTES # BLD AUTO: 0.48 10*3/MM3 (ref 0.1–0.9)
MONOCYTES NFR BLD AUTO: 8.2 % (ref 5–12)
NEUTROPHILS NFR BLD AUTO: 3.47 10*3/MM3 (ref 1.7–7)
NEUTROPHILS NFR BLD AUTO: 59.1 % (ref 42.7–76)
NITRITE UR QL STRIP: NEGATIVE
NRBC BLD AUTO-RTO: 0 /100 WBC (ref 0–0.2)
PH UR STRIP.AUTO: 5.5 [PH] (ref 5–8)
PLATELET # BLD AUTO: 270 10*3/MM3 (ref 140–450)
PMV BLD AUTO: 10.6 FL (ref 6–12)
POTASSIUM SERPL-SCNC: 4.3 MMOL/L (ref 3.5–5.2)
PROT SERPL-MCNC: 6.6 G/DL (ref 6–8.5)
PROT UR QL STRIP: NEGATIVE
RBC # BLD AUTO: 4.61 10*6/MM3 (ref 3.77–5.28)
SODIUM SERPL-SCNC: 138 MMOL/L (ref 136–145)
SP GR UR STRIP: 1.02 (ref 1–1.03)
TRIGL SERPL-MCNC: 126 MG/DL (ref 0–150)
TSH SERPL DL<=0.05 MIU/L-ACNC: 3.52 UIU/ML (ref 0.27–4.2)
UROBILINOGEN UR QL STRIP: ABNORMAL
VLDLC SERPL-MCNC: 22 MG/DL (ref 5–40)
WBC NRBC COR # BLD: 5.86 10*3/MM3 (ref 3.4–10.8)

## 2022-08-23 PROCEDURE — 80050 GENERAL HEALTH PANEL: CPT | Performed by: NURSE PRACTITIONER

## 2022-08-23 PROCEDURE — 99214 OFFICE O/P EST MOD 30 MIN: CPT | Performed by: NURSE PRACTITIONER

## 2022-08-23 PROCEDURE — 83036 HEMOGLOBIN GLYCOSYLATED A1C: CPT | Performed by: NURSE PRACTITIONER

## 2022-08-23 PROCEDURE — 81003 URINALYSIS AUTO W/O SCOPE: CPT | Performed by: NURSE PRACTITIONER

## 2022-08-23 PROCEDURE — 80061 LIPID PANEL: CPT | Performed by: NURSE PRACTITIONER

## 2022-08-23 RX ORDER — CETIRIZINE HYDROCHLORIDE 10 MG/1
10 TABLET ORAL
Qty: 90 TABLET | Refills: 1 | Status: SHIPPED | OUTPATIENT
Start: 2022-08-23 | End: 2023-02-24 | Stop reason: SDUPTHER

## 2022-08-23 RX ORDER — VENLAFAXINE HYDROCHLORIDE 75 MG/1
75 CAPSULE, EXTENDED RELEASE ORAL DAILY
Qty: 90 CAPSULE | Refills: 1 | Status: SHIPPED | OUTPATIENT
Start: 2022-08-23 | End: 2023-02-24 | Stop reason: SDUPTHER

## 2022-08-23 RX ORDER — FLUTICASONE PROPIONATE 50 MCG
2 SPRAY, SUSPENSION (ML) NASAL DAILY
Qty: 16 G | Refills: 5 | Status: SHIPPED | OUTPATIENT
Start: 2022-08-23 | End: 2023-02-24 | Stop reason: SDUPTHER

## 2022-08-23 NOTE — PROGRESS NOTES
"Chief Complaint  Pain and Follow-up of the Left Knee     Subjective      Luis Masterson presents to [facility] for a follow-up of left knee. Patient complains of joint line pain in the knee. She had an injury 3-4 months ago. [place] had fallen onto her leg resulting in pain. She has been having persistent pain since then. Colasanti injected her knee, this failed to give her relief. Patient presents today with MRI results of the knee.      Allergies   Allergen Reactions   • Sulfa Antibiotics Swelling        Social History     Socioeconomic History   • Marital status:    Tobacco Use   • Smoking status: Former Smoker   • Smokeless tobacco: Never Used   Vaping Use   • Vaping Use: Never used   Substance and Sexual Activity   • Alcohol use: Never   • Drug use: Never   • Sexual activity: Defer        Review of Systems     Objective   Vital Signs:   Pulse 79   Ht 154.9 cm (61\")   Wt 76.2 kg (168 lb)   SpO2 98%   BMI 31.74 kg/m²       Physical Exam  Constitutional:       Appearance: Normal appearance. Patient is well-developed and normal weight.   HENT:      Head: Normocephalic.      Right Ear: Hearing and external ear normal.      Left Ear: Hearing and external ear normal.      Nose: Nose normal.   Eyes:      Conjunctiva/sclera: Conjunctivae normal.   Cardiovascular:      Rate and Rhythm: Normal rate.   Pulmonary:      Effort: Pulmonary effort is normal.      Breath sounds: No wheezing or rales.   Abdominal:      Palpations: Abdomen is soft.      Tenderness: There is no abdominal tenderness.   Musculoskeletal:      Cervical back: Normal range of motion.   Skin:     Findings: No rash.   Neurological:      Mental Status: Patient  is alert and oriented to person, place, and time.   Psychiatric:         Mood and Affect: Mood and affect normal.         Judgment: Judgment normal.       Ortho Exam      LEFT KNEE: Mild effusion. Tender medial joint line. Non-tender lateral joint line. Calf " supple, non-tender, no signs of DVT. Dorsal Pedal Pulse 2+, posterior tibialis pulse 2+. Skin intact. Full extension. Flexion to 120 degrees. Limping gait. Positive Apley's. Good strength to hamstrings, quadriceps, dorsiflexors and plantar flexors.       Procedures        Imaging Results (Most Recent)     None           Result Review :         XR Knee 3 View Left    Result Date: 8/2/2022  Narrative: PROCEDURE: XR KNEE 3 VW LEFT  COMPARISON: None  INDICATIONS: LEFT KNEE PAIN AND SWELING  FINDINGS: There is no evidence for displaced fracture or dislocation.  A joint effusion is present.  Mild tricompartmental osteoarthritic degenerative changes are observed. These findings are most pronounced within the medial compartment. No focal osseous abnormalities are seen. The soft tissues are unremarkable.      Impression:   No acute osseous abnormality.  A joint effusion is present which may be related to internal derangement..   Mild tricompartmental osteoarthritic degenerative changes of the knee.    JIN BARAJAS MD       Electronically Signed and Approved By: JIN BARAJAS MD on 8/02/2022 at 13:53             MRI Knee Left Without Contrast    Result Date: 8/10/2022  Narrative: PROCEDURE: MRI KNEE LEFT  WO CONTRAST  COMPARISON: None  INDICATIONS: Knee trauma, meniscal/ligament injury suspected, xray done (Age >= 1y)      TECHNIQUE: A complete multi-planar MRI was performed.   FINDINGS:  The cruciate ligaments, collateral ligaments, and extensor mechanism of the knee are intact.  There is abnormal signal involving the posterior horn and posterior body segment of the medial meniscus.  The findings suggest a horizontal type meniscal tear which extends through the inferior articular surface.  No displaced fragment is seen.  There is also abnormal signal at the peripheral margin of the meniscus at the posterior body segment.  These findings suggest a potential peripheral component of the tear versus changes secondary to  meniscocapsular injury.  There is no evidence for lateral meniscal tear.   A small joint effusion is observed.  There is evidence for an incidental plica at the superior medial aspect of the patellofemoral compartment.  No significant articular cartilage defects are identified. There is no evidence for osteochondral injury. No displaced osteochondral fragments are seen. No significant focal abnormal bone marrow signal is identified. The cortical margins are intact. No significant abnormal focal fluid collection is observed within the surrounding soft tissues.      Impression:   1. Evidence for horizontal type meniscal tear involving the posterior horn and body segment of the medial meniscus.  The tear extends through the inferior articular surface.  There is no displaced fragment. 2. There is also abnormal signal at the peripheral margin of the posterior body segment of the medial meniscus.  The findings may indicate a component of peripheral meniscal tear.  Changes secondary to meniscocapsular injury could also have this appearance.       CROW HASSAN MD       Electronically Signed and Approved By: CROW HASSAN MD on 8/10/2022 at 16:47                      Assessment and Plan     Diagnoses and all orders for this visit:    1. Acute medial meniscus tear of left knee, initial encounter (Primary)        Discussed left knee arthroscopy vs continuation of conservative measures. Patient wishes to proceed with a left knee arthroscopy.     Discussed surgery., Risks/benefits discussed with patient including, but not limited to: infection, bleeding, neurovascular damage, re-rupture, aesthetic deformity, need for further surgery, and death. and Surgery pamphlet given.    Follow Up     Post-operatively.       Patient was given instructions and counseling regarding her condition or for health maintenance advice. Please see specific information pulled into the AVS if appropriate.     Scribed for Lloyd Robles MD by  Katheryn Zhang.  08/23/22   12:02 EDT      I have personally performed the services described in this document as scribed by the above individual and it is both accurate and complete. Lloyd Robles MD 08/24/22

## 2022-08-23 NOTE — PROGRESS NOTES
Follow Up Office Visit      Patient Name: Luis Masterson  : 1965   MRN: [id number]     Chief Complaint:    Chief Complaint   Patient presents with   • Hyperlipidemia   • Anxiety   • Chronic Kidney Disease   • Elevated Hepatic Enzymes   • Obesity       History of Present Illness: Luis Masterson is a 57 y.o. female who is here today to follow up for hyperlipidemia, obesity,  Seasonal allergies, hot flashes, night sweats, impaired fasting glucose,  Constipation, anxiety, ckd , obesity, elevated liver enzymes      Follow up increased dose of effexor     Pap-  Mammogram   dexa- 2018  Labs- 2022  Colonoscopy awaiting to scheduled after next 2 surgeries     Follow up with dr ivey next month to schedule bladder lift     Next month left knee repair surgery scheduled next month         Subjective      Review of Systems:   Review of Systems   Constitutional: Negative for chills and fever.   HENT: Negative for ear pain and sore throat.    Respiratory: Negative for cough.    Cardiovascular: Negative for chest pain.   Gastrointestinal: Negative for abdominal pain, diarrhea, nausea and vomiting.   Endocrine: Negative for polydipsia and polyuria.   Genitourinary: Negative for dysuria.   Musculoskeletal: Positive for arthralgias and joint swelling.   Neurological: Negative for headaches.        Past Medical History:   Past Medical History:   Diagnosis Date   • Kidney stones    • Night sweats        Past Surgical History:   Past Surgical History:   Procedure Laterality Date   • CARPAL TUNNEL RELEASE     • CHOLECYSTECTOMY     • ENDOMETRIAL ABLATION     • KIDNEY STONE SURGERY     • MASTECTOMY Bilateral    • THORACIC OUTLET SURGERY         Family History:   Family History   Problem Relation Age of Onset   • Breast cancer Other    • Stroke Other    • Heart disease Other    • Diabetes Other        Social History:   Social History     Socioeconomic History   • Marital status:    Tobacco Use   • Smoking  "status: Former Smoker   • Smokeless tobacco: Never Used   Vaping Use   • Vaping Use: Never used   Substance and Sexual Activity   • Alcohol use: Never   • Drug use: Never   • Sexual activity: Defer       Medications:     Current Outpatient Medications:   •  Acetaminophen 325 MG capsule, 650 mg., Disp: , Rfl:   •  cetirizine (zyrTEC) 10 MG tablet, Take 1 tablet by mouth every night at bedtime., Disp: 90 tablet, Rfl: 1  •  docusate sodium (Colace) 100 MG capsule, Take 1 capsule by mouth 2 (Two) Times a Day As Needed for Constipation., Disp: 180 capsule, Rfl: 1  •  fluticasone (FLONASE) 50 MCG/ACT nasal spray, 2 sprays into the nostril(s) as directed by provider Daily., Disp: 16 g, Rfl: 5  •  Insulin Pen Needle (BD Pen Needle Lillie 2nd Gen) 32G X 4 MM misc, 1 each Daily., Disp: 100 each, Rfl: 2  •  Liraglutide (Saxenda) 18 MG/3ML injection pen, Inject 3 mg under the skin into the appropriate area as directed Daily., Disp: 5 pen, Rfl: 5  •  melatonin 5 MG tablet tablet, 5 mg., Disp: , Rfl:   •  ondansetron (ZOFRAN) 4 MG tablet, 4 mg., Disp: , Rfl:   •  sennosides-docusate (PERICOLACE) 8.6-50 MG per tablet,  1 Tab, Oral, Tab, BID, # 60 Tab, 0 Refill(s), Pharmacy: [facility], 11/16/21 17:28:00 EST, CLIN[facility], 154.94, Disp: , Rfl:   •  traMADol (ULTRAM) 50 MG tablet, Take 1 tablet by mouth Every 6 (Six) Hours As Needed for Moderate Pain ., Disp: 60 tablet, Rfl: 0  •  valACYclovir (Valtrex) 1000 MG tablet, Take 1 tablet by mouth 2 (Two) Times a Day., Disp: 60 tablet, Rfl: 2  •  venlafaxine XR (Effexor XR) 75 MG 24 hr capsule, Take 1 capsule by mouth Daily., Disp: 90 capsule, Rfl: 1    Allergies:   Allergies   Allergen Reactions   • Sulfa Antibiotics Swelling           Objective     Physical Exam:  Vital Signs:   Vitals:    08/23/22 0824   BP: 140/77   Pulse: 72   Temp: 98.7 °F (37.1 °C)   SpO2: 100%   Weight: 78.5 kg (173 lb)   Height: 154.9 cm (61\")     Body mass index is 32.69 kg/m². "     Physical Exam  HENT:      Right Ear: Tympanic membrane normal.      Left Ear: Tympanic membrane normal.      Nose: Nose normal.      Mouth/Throat:      Mouth: Mucous membranes are moist.   Eyes:      Conjunctiva/sclera: Conjunctivae normal.   Neck:      Vascular: No carotid bruit.   Cardiovascular:      Rate and Rhythm: Normal rate and regular rhythm.      Heart sounds: Normal heart sounds. No murmur heard.  Pulmonary:      Effort: Pulmonary effort is normal.      Breath sounds: Normal breath sounds.   Abdominal:      General: Bowel sounds are normal.      Palpations: Abdomen is soft.   Musculoskeletal:      Right lower leg: No edema.      Left lower leg: No edema.   Skin:     General: Skin is warm and dry.   Neurological:      Mental Status: She is alert.   Psychiatric:         Mood and Affect: Mood normal.         Behavior: Behavior normal.             Assessment / Plan      Assessment/Plan:   Diagnoses and all orders for this visit:    1. Impaired fasting glucose  -     Comprehensive Metabolic Panel  -     Hemoglobin A1c  -     Urinalysis With Culture If Indicated -    2. Mixed hyperlipidemia  -     Lipid Panel    3. Elevated blood pressure reading    4. Obesity (BMI 30.0-34.9)    5. Seasonal allergies  -     CBC Auto Differential    6. Anxiety    7. Chronic kidney disease, unspecified CKD stage    8. Hot flashes    9. Night sweats    10. Elevated liver enzymes    11. Fatty liver    12. Screening for thyroid disorder  -     TSH    Other orders  -     venlafaxine XR (Effexor XR) 75 MG 24 hr capsule; Take 1 capsule by mouth Daily.  Dispense: 90 capsule; Refill: 1  -     fluticasone (FLONASE) 50 MCG/ACT nasal spray; 2 sprays into the nostril(s) as directed by provider Daily.  Dispense: 16 g; Refill: 5  -     cetirizine (zyrTEC) 10 MG tablet; Take 1 tablet by mouth every night at bedtime.  Dispense: 90 tablet; Refill: 1       Impaired fasting glucose obtain hemoglobin A1c to monitor recommend decrease carb intake  "and exercise 30 minutes daily patient reports burns of his right arm for working out  Hyperlipidemia we will obtain lipid panel to monitor recommend decrease fried foods red meat pork products cheese increase fruits vegetables whole grains we will call with results and further recommendations  Anxiety night sweats hot flashes controlled with Effexor 75 mg daily  Elevated liver enzymes will obtain CMP to monitor previous history of fatty liver recommend weight loss  Seasonal allergies stable on Flonase and Zyrtec will provide refills  Chronic kidney disease recommend avoid all NSAIDs increase water intake will assess with CMP  Elevated blood pressure reading recommend decrease salt intake increase water intake manual blood pressure check in 1 to 2 weeks if remains elevated will start lisinopril hydrochlorothiazide      Follow Up:   Return in about 6 months (around 2/23/2023).    Melinda Torres, APRN    \"Please note that portions of this note were completed with a voice recognition program.\"    "

## 2022-08-29 PROBLEM — M23.301: Status: ACTIVE | Noted: 2022-08-29

## 2022-08-31 ENCOUNTER — TELEPHONE (OUTPATIENT)
Dept: FAMILY MEDICINE CLINIC | Facility: CLINIC | Age: 57
End: 2022-08-31

## 2022-08-31 NOTE — TELEPHONE ENCOUNTER
----- Message from RODGER Joyner sent at 8/26/2022  1:11 PM EDT -----  Average blood sugar elevated at risk for diabetes does not have diabetes at this time recommend decrease carb intake increase lean protein intake exercise 30 minutes daily we will recheck at follow-up May refer to nutritionist patient needs a manual blood pressure check in 1 week  The 10-year ASCVD risk score (Randa PIPER Jr., et al., 2013) is: 2.8%    Values used to calculate the score:      Age: 57 years      Sex: Female      Is Non- : No      Diabetic: No      Tobacco smoker: No      Systolic Blood Pressure: 140 mmHg      Is BP treated: No      HDL Cholesterol: 58 mg/dL      Total Cholesterol: 243 mg/dL  LDL cholesterol high at 163 decrease fried foods red meat pork products cheese increase fruits vegetables whole grains beans and nuts

## 2022-09-15 NOTE — PRE-PROCEDURE INSTRUCTIONS
Patient instructed to have no food past midnight, clears up to 2 hours prior to arrival time. Patient instructed to wear no lotions, jewelry or piercing's day of surgery. Patient to shower with surgical soap am of surgery.

## 2022-09-22 ENCOUNTER — ANESTHESIA (OUTPATIENT)
Dept: PERIOP | Facility: HOSPITAL | Age: 57
End: 2022-09-22

## 2022-09-22 ENCOUNTER — HOSPITAL ENCOUNTER (OUTPATIENT)
Facility: HOSPITAL | Age: 57
Discharge: HOME | End: 2022-09-22
Attending: ORTHOPAEDIC SURGERY | Admitting: ORTHOPAEDIC SURGERY

## 2022-09-22 ENCOUNTER — ANESTHESIA EVENT (OUTPATIENT)
Dept: PERIOP | Facility: HOSPITAL | Age: 57
End: 2022-09-22

## 2022-09-22 VITALS
SYSTOLIC BLOOD PRESSURE: 130 MMHG | HEIGHT: 61 IN | HEART RATE: 75 BPM | WEIGHT: 165.12 LBS | OXYGEN SATURATION: 98 % | RESPIRATION RATE: 18 BRPM | TEMPERATURE: 97.2 F | BODY MASS INDEX: 31.18 KG/M2 | DIASTOLIC BLOOD PRESSURE: 85 MMHG

## 2022-09-22 DIAGNOSIS — M23.322 MEDIAL MENISCUS, POSTERIOR HORN DERANGEMENT, LEFT: Primary | ICD-10-CM

## 2022-09-22 DIAGNOSIS — M23.301 MENISCUS, LATERAL, DERANGEMENT, LEFT: ICD-10-CM

## 2022-09-22 LAB — QT INTERVAL: 398 MS

## 2022-09-22 PROCEDURE — 29881 ARTHRS KNE SRG MNISECTMY M/L: CPT | Performed by: ORTHOPAEDIC SURGERY

## 2022-09-22 PROCEDURE — 25010000002 DEXAMETHASONE PER 1 MG: Performed by: NURSE ANESTHETIST, CERTIFIED REGISTERED

## 2022-09-22 PROCEDURE — 25010000002 FENTANYL CITRATE (PF) 50 MCG/ML SOLUTION: Performed by: NURSE ANESTHETIST, CERTIFIED REGISTERED

## 2022-09-22 PROCEDURE — 25010000002 MIDAZOLAM PER 1 MG: Performed by: ANESTHESIOLOGY

## 2022-09-22 PROCEDURE — 93005 ELECTROCARDIOGRAM TRACING: CPT | Performed by: ANESTHESIOLOGY

## 2022-09-22 PROCEDURE — 25010000002 ONDANSETRON PER 1 MG: Performed by: NURSE ANESTHETIST, CERTIFIED REGISTERED

## 2022-09-22 PROCEDURE — S0260 H&P FOR SURGERY: HCPCS | Performed by: ORTHOPAEDIC SURGERY

## 2022-09-22 PROCEDURE — 25010000002 CEFAZOLIN IN DEXTROSE 2-4 GM/100ML-% SOLUTION: Performed by: ORTHOPAEDIC SURGERY

## 2022-09-22 PROCEDURE — 93010 ELECTROCARDIOGRAM REPORT: CPT | Performed by: INTERNAL MEDICINE

## 2022-09-22 PROCEDURE — 25010000002 PROPOFOL 10 MG/ML EMULSION: Performed by: NURSE ANESTHETIST, CERTIFIED REGISTERED

## 2022-09-22 RX ORDER — ONDANSETRON 2 MG/ML
4 INJECTION INTRAMUSCULAR; INTRAVENOUS ONCE AS NEEDED
Status: DISCONTINUED | OUTPATIENT
Start: 2022-09-22 | End: 2022-09-22 | Stop reason: HOSPADM

## 2022-09-22 RX ORDER — FENTANYL CITRATE 50 UG/ML
INJECTION, SOLUTION INTRAMUSCULAR; INTRAVENOUS AS NEEDED
Status: DISCONTINUED | OUTPATIENT
Start: 2022-09-22 | End: 2022-09-22 | Stop reason: SURG

## 2022-09-22 RX ORDER — PROMETHAZINE HYDROCHLORIDE 25 MG/1
25 SUPPOSITORY RECTAL ONCE AS NEEDED
Status: DISCONTINUED | OUTPATIENT
Start: 2022-09-22 | End: 2022-09-22 | Stop reason: HOSPADM

## 2022-09-22 RX ORDER — MAGNESIUM HYDROXIDE 1200 MG/15ML
LIQUID ORAL AS NEEDED
Status: DISCONTINUED | OUTPATIENT
Start: 2022-09-22 | End: 2022-09-22 | Stop reason: HOSPADM

## 2022-09-22 RX ORDER — LIDOCAINE HYDROCHLORIDE 20 MG/ML
INJECTION, SOLUTION EPIDURAL; INFILTRATION; INTRACAUDAL; PERINEURAL AS NEEDED
Status: DISCONTINUED | OUTPATIENT
Start: 2022-09-22 | End: 2022-09-22 | Stop reason: SURG

## 2022-09-22 RX ORDER — SCOLOPAMINE TRANSDERMAL SYSTEM 1 MG/1
1 PATCH, EXTENDED RELEASE TRANSDERMAL ONCE
Status: DISCONTINUED | OUTPATIENT
Start: 2022-09-22 | End: 2022-09-22 | Stop reason: HOSPADM

## 2022-09-22 RX ORDER — ACETAMINOPHEN 500 MG
1000 TABLET ORAL ONCE
Status: COMPLETED | OUTPATIENT
Start: 2022-09-22 | End: 2022-09-22

## 2022-09-22 RX ORDER — MEPERIDINE HYDROCHLORIDE 25 MG/ML
12.5 INJECTION INTRAMUSCULAR; INTRAVENOUS; SUBCUTANEOUS
Status: DISCONTINUED | OUTPATIENT
Start: 2022-09-22 | End: 2022-09-22 | Stop reason: HOSPADM

## 2022-09-22 RX ORDER — ESMOLOL HYDROCHLORIDE 10 MG/ML
INJECTION INTRAVENOUS AS NEEDED
Status: DISCONTINUED | OUTPATIENT
Start: 2022-09-22 | End: 2022-09-22 | Stop reason: SURG

## 2022-09-22 RX ORDER — PROPOFOL 10 MG/ML
VIAL (ML) INTRAVENOUS AS NEEDED
Status: DISCONTINUED | OUTPATIENT
Start: 2022-09-22 | End: 2022-09-22 | Stop reason: SURG

## 2022-09-22 RX ORDER — CEFAZOLIN SODIUM 2 G/100ML
2 INJECTION, SOLUTION INTRAVENOUS ONCE
Status: COMPLETED | OUTPATIENT
Start: 2022-09-22 | End: 2022-09-22

## 2022-09-22 RX ORDER — ONDANSETRON 2 MG/ML
INJECTION INTRAMUSCULAR; INTRAVENOUS AS NEEDED
Status: DISCONTINUED | OUTPATIENT
Start: 2022-09-22 | End: 2022-09-22 | Stop reason: SURG

## 2022-09-22 RX ORDER — OXYCODONE HYDROCHLORIDE 5 MG/1
5 TABLET ORAL
Status: DISCONTINUED | OUTPATIENT
Start: 2022-09-22 | End: 2022-09-22 | Stop reason: HOSPADM

## 2022-09-22 RX ORDER — HYDROCODONE BITARTRATE AND ACETAMINOPHEN 7.5; 325 MG/1; MG/1
1 TABLET ORAL EVERY 4 HOURS PRN
Qty: 25 TABLET | Refills: 0 | Status: SHIPPED | OUTPATIENT
Start: 2022-09-22 | End: 2023-02-24

## 2022-09-22 RX ORDER — BUPIVACAINE HYDROCHLORIDE AND EPINEPHRINE 5; 5 MG/ML; UG/ML
INJECTION, SOLUTION EPIDURAL; INTRACAUDAL; PERINEURAL AS NEEDED
Status: DISCONTINUED | OUTPATIENT
Start: 2022-09-22 | End: 2022-09-22 | Stop reason: HOSPADM

## 2022-09-22 RX ORDER — DEXAMETHASONE SODIUM PHOSPHATE 4 MG/ML
INJECTION, SOLUTION INTRA-ARTICULAR; INTRALESIONAL; INTRAMUSCULAR; INTRAVENOUS; SOFT TISSUE AS NEEDED
Status: DISCONTINUED | OUTPATIENT
Start: 2022-09-22 | End: 2022-09-22 | Stop reason: SURG

## 2022-09-22 RX ORDER — MIDAZOLAM HYDROCHLORIDE 1 MG/ML
2 INJECTION INTRAMUSCULAR; INTRAVENOUS ONCE
Status: COMPLETED | OUTPATIENT
Start: 2022-09-22 | End: 2022-09-22

## 2022-09-22 RX ORDER — CEFAZOLIN SODIUM IN 0.9 % NACL 3 G/100 ML
3 INTRAVENOUS SOLUTION, PIGGYBACK (ML) INTRAVENOUS ONCE
Status: DISCONTINUED | OUTPATIENT
Start: 2022-09-22 | End: 2022-09-22 | Stop reason: ALTCHOICE

## 2022-09-22 RX ORDER — PROMETHAZINE HYDROCHLORIDE 12.5 MG/1
25 TABLET ORAL ONCE AS NEEDED
Status: DISCONTINUED | OUTPATIENT
Start: 2022-09-22 | End: 2022-09-22 | Stop reason: HOSPADM

## 2022-09-22 RX ORDER — ROCURONIUM BROMIDE 10 MG/ML
INJECTION, SOLUTION INTRAVENOUS AS NEEDED
Status: DISCONTINUED | OUTPATIENT
Start: 2022-09-22 | End: 2022-09-22 | Stop reason: SURG

## 2022-09-22 RX ORDER — SODIUM CHLORIDE, SODIUM LACTATE, POTASSIUM CHLORIDE, CALCIUM CHLORIDE 600; 310; 30; 20 MG/100ML; MG/100ML; MG/100ML; MG/100ML
9 INJECTION, SOLUTION INTRAVENOUS CONTINUOUS PRN
Status: DISCONTINUED | OUTPATIENT
Start: 2022-09-22 | End: 2022-09-22 | Stop reason: HOSPADM

## 2022-09-22 RX ADMIN — MIDAZOLAM HYDROCHLORIDE 2 MG: 1 INJECTION, SOLUTION INTRAMUSCULAR; INTRAVENOUS at 08:03

## 2022-09-22 RX ADMIN — SODIUM CHLORIDE, POTASSIUM CHLORIDE, SODIUM LACTATE AND CALCIUM CHLORIDE 9 ML/HR: 600; 310; 30; 20 INJECTION, SOLUTION INTRAVENOUS at 08:04

## 2022-09-22 RX ADMIN — SUGAMMADEX 200 MG: 100 INJECTION, SOLUTION INTRAVENOUS at 08:47

## 2022-09-22 RX ADMIN — ONDANSETRON 4 MG: 2 INJECTION INTRAMUSCULAR; INTRAVENOUS at 08:28

## 2022-09-22 RX ADMIN — SCOPALAMINE 1 PATCH: 1 PATCH, EXTENDED RELEASE TRANSDERMAL at 08:03

## 2022-09-22 RX ADMIN — DEXAMETHASONE SODIUM PHOSPHATE 4 MG: 4 INJECTION, SOLUTION INTRA-ARTICULAR; INTRALESIONAL; INTRAMUSCULAR; INTRAVENOUS; SOFT TISSUE at 08:28

## 2022-09-22 RX ADMIN — OXYCODONE HYDROCHLORIDE 5 MG: 5 TABLET ORAL at 09:09

## 2022-09-22 RX ADMIN — CEFAZOLIN SODIUM 2 G: 2 INJECTION, SOLUTION INTRAVENOUS at 08:26

## 2022-09-22 RX ADMIN — FENTANYL CITRATE 100 MCG: 50 INJECTION, SOLUTION INTRAMUSCULAR; INTRAVENOUS at 08:20

## 2022-09-22 RX ADMIN — LIDOCAINE HYDROCHLORIDE 100 MG: 20 INJECTION, SOLUTION EPIDURAL; INFILTRATION; INTRACAUDAL; PERINEURAL at 08:20

## 2022-09-22 RX ADMIN — ESMOLOL HYDROCHLORIDE 10 MG: 10 INJECTION INTRAVENOUS at 08:29

## 2022-09-22 RX ADMIN — ROCURONIUM BROMIDE 50 MG: 10 INJECTION INTRAVENOUS at 08:22

## 2022-09-22 RX ADMIN — PROPOFOL 150 MG: 10 INJECTION, EMULSION INTRAVENOUS at 08:20

## 2022-09-22 RX ADMIN — ACETAMINOPHEN 1000 MG: 500 TABLET, FILM COATED ORAL at 08:03

## 2022-09-22 NOTE — H&P
"h and p      Chief Complaint  Pain and Follow-up of the Left Knee        Subjective          Luis Masterson presents to [facility] for a follow-up of left knee. Patient complains of joint line pain in the knee. She had an injury 3-4 months ago. [place] had fallen onto her leg resulting in pain. She has been having persistent pain since then. Colasanti injected her knee, this failed to give her relief. Patient presents today with MRI results of the knee.            Allergies   Allergen Reactions   • Sulfa Antibiotics Swelling         Social History   Social History      Socioeconomic History   • Marital status:    Tobacco Use   • Smoking status: Former Smoker   • Smokeless tobacco: Never Used   Vaping Use   • Vaping Use: Never used   Substance and Sexual Activity   • Alcohol use: Never   • Drug use: Never   • Sexual activity: Defer            Review of Systems            Objective      Vital Signs:   Pulse 79   Ht 154.9 cm (61\")   Wt 76.2 kg (168 lb)   SpO2 98%   BMI 31.74 kg/m²        Physical Exam  Constitutional:       Appearance: Normal appearance. Patient is well-developed and normal weight.   HENT:      Head: Normocephalic.      Right Ear: Hearing and external ear normal.      Left Ear: Hearing and external ear normal.      Nose: Nose normal.   Eyes:      Conjunctiva/sclera: Conjunctivae normal.   Cardiovascular:      Rate and Rhythm: Normal rate.   Pulmonary:      Effort: Pulmonary effort is normal.      Breath sounds: No wheezing or rales.   Abdominal:      Palpations: Abdomen is soft.      Tenderness: There is no abdominal tenderness.   Musculoskeletal:      Cervical back: Normal range of motion.   Skin:     Findings: No rash.   Neurological:      Mental Status: Patient  is alert and oriented to person, place, and time.   Psychiatric:         Mood and Affect: Mood and affect normal.         Judgment: Judgment normal.         Ortho Exam       LEFT KNEE: Mild effusion. " Tender medial joint line. Non-tender lateral joint line. Calf supple, non-tender, no signs of DVT. Dorsal Pedal Pulse 2+, posterior tibialis pulse 2+. Skin intact. Full extension. Flexion to 120 degrees. Limping gait. Positive Apley's. Good strength to hamstrings, quadriceps, dorsiflexors and plantar flexors.         Procedures               Imaging Results (Most Recent)      None                   Result Review    :            XR Knee 3 View Left     Result Date: 8/2/2022  Narrative: PROCEDURE:       XR KNEE 3 VW LEFT  COMPARISON:        None  INDICATIONS:           LEFT KNEE PAIN AND SWELING  FINDINGS:        There is no evidence for displaced fracture or dislocation.  A joint effusion is present.  Mild tricompartmental osteoarthritic degenerative changes are observed. These findings are most pronounced within the medial compartment. No focal osseous abnormalities are seen. The soft tissues are unremarkable.       Impression:      No acute osseous abnormality.  A joint effusion is present which may be related to internal derangement..   Mild tricompartmental osteoarthritic degenerative changes of the knee.    JIN BARAJAS MD       Electronically Signed and Approved By: JIN BARAJAS MD on 8/02/2022 at 13:53              MRI Knee Left Without Contrast     Result Date: 8/10/2022  Narrative: PROCEDURE:       MRI KNEE LEFT  WO CONTRAST  COMPARISON:           None  INDICATIONS:          Knee trauma, meniscal/ligament injury suspected, xray done (Age >= 1y)                TECHNIQUE:           A complete multi-planar MRI was performed.   FINDINGS:      The cruciate ligaments, collateral ligaments, and extensor mechanism of the knee are intact.  There is abnormal signal involving the posterior horn and posterior body segment of the medial meniscus.  The findings suggest a horizontal type meniscal tear which extends through the inferior articular surface.  No displaced fragment is seen.  There is also abnormal signal at the  peripheral margin of the meniscus at the posterior body segment.  These findings suggest a potential peripheral component of the tear versus changes secondary to meniscocapsular injury.  There is no evidence for lateral meniscal tear.   A small joint effusion is observed.  There is evidence for an incidental plica at the superior medial aspect of the patellofemoral compartment.  No significant articular cartilage defects are identified. There is no evidence for osteochondral injury. No displaced osteochondral fragments are seen. No significant focal abnormal bone marrow signal is identified. The cortical margins are intact. No significant abnormal focal fluid collection is observed within the surrounding soft tissues.       Impression:      1. Evidence for horizontal type meniscal tear involving the posterior horn and body segment of the medial meniscus.  The tear extends through the inferior articular surface.  There is no displaced fragment. 2. There is also abnormal signal at the peripheral margin of the posterior body segment of the medial meniscus.  The findings may indicate a component of peripheral meniscal tear.  Changes secondary to meniscocapsular injury could also have this appearance.       CROW HASSAN MD       Electronically Signed and Approved By: CROW HASSAN MD on 8/10/2022 at 16:47                          Assessment      Assessment and Plan      Diagnoses and all orders for this visit:     1. Acute medial meniscus tear of left knee, initial encounter (Primary)           Discussed left knee arthroscopy vs continuation of conservative measures. Patient wishes to proceed with a left knee arthroscopy.      Discussed surgery., Risks/benefits discussed with patient including, but not limited to: infection, bleeding, neurovascular damage, re-rupture, aesthetic deformity, need for further surgery, and death. and Surgery pamphlet given.     Follow Up      Post-operatively.      Lloyd Robles,  MD  09/22/22

## 2022-09-22 NOTE — DISCHARGE INSTRUCTIONS
DISCHARGE INSTRUCTIONS  POST-OPERATIVE  Knee Arthroscopic Surgery      For your surgery you had:  General anesthesia (you may have a sore throat for the first 24 hours)  You received an anesthesia medication today that can cause hormonal forms of birth control to be ineffective. You should use a different form of birth control (to prevent pregnancy) for 7 days.   IV sedation.  Local anesthesia  Monitored anesthesia care  You may experience dizziness, drowsiness, or light-headedness for several hours following surgery/procedure.  Do not stay alone today or tonight.  Limit your activity for 24 hours.  Resume your diet slowly.  Follow whatever special dietary instructions you may have been given by your doctor.  You should not drive or operate machinery or drink alcohol for 24 hours or while you are taking pain medication.  You should not sign legally binding documents.    Post-Operative Day #1 Friday  Post-Operative Day #1 is counted as the 1st day after surgery.  Leave ace wrap on day one to aid in the reduction of swelling.  If dressing is too tight it can be rewrapped.  Post-Operative Day #2 Saturday  Ace wrap and dressing may be removed.  Put a 4x4 dressing to suture or staple site.  Change dressing once or twice daily until suture or staples are removed.  It is normal to have some redness or irritation around skin sutures or stapes, however, if you have any expanding areas of redness with a persistent fever and increasing pain notify the physician as soon as possible.  On Post-Operative Day #2, you may want to reapply the ace wrap.  Put ace wrap directly over the knee to add compression and aid in swelling reduction.  It is normal to have some swelling down into the calf and ankle after knee arthroscopy or Anterior Cruciate Ligament (ACL) reconstruction.  If you notice a significant amount of swelling or increasing pain in calf area, notify the physician immediately.   Post-Operative Day #4 Monday  You may  shower.  During shower, avoid too much water to incision site.  Let water run down leg and then pat dry.  Do not put any ointments on the incision unless directed by surgeon.  Reapply dry dressing to sutures or staple sites.    General Information  Full weight bearing with crutches is allowed after a standard knee arthroscopy or ACL reconstruction unless instructed otherwise by surgeon.  You may put as much weight on knee as tolerable.  If given a knee immobilizer postoperatively, usually with an ACL reconstruction, this is for protection with early ambulation until you are steadier on your feet.  It is very important to take off immobilizer to do range of motion and flexion and extension exercises.  You do not have to sleep in the knee immobilizer.  Knee range of motion exercises should be started as early as the day of surgery.  Try to achieve full extension and start working on range of motion and flexion of the knee.  Move the ankle up and down periodically to help reduce swelling as well as reduce blood pooling.  To allow full extension of the knee and prevent blood clots it is very important to put pillows at the level of the mid-calf to the foot.  DO NOT put pillows directly behind knee.  SPECIAL INSTRUCTIONS:                                                             DISCHARGE INSTRUCTIONS  POST-OPERATIVE   Knee Arthroscopic Surgery      General Instructions  You will receive a prescription for physical therapy, unless otherwise instructed by physician.  Physical therapy is imperative especially after ACL reconstruction and some knee arthroscopies for swelling reduction, knee range of motion and strengthening.  [x] Use ice pack on the knee for 20 minutes on and 20 minutes off.  Never place ice directly on the skin.  By following this, you will cool the knee sufficiently.  Avoid getting dressing wet.  To help reduce swelling and minimize throbbing pain, use pillows to keep the knee elevated above the level  of the heart, even while sleeping.  Sit with the leg propped up on a footstool or chair with pillows.  Exercises toes for 10 minutes every hour while awake.  If you are given a prescription for pain medication, take it as prescribed.  If you are able to take over-the-counter anti-inflammatory medications such as Motrin or Aleve, you may take as per package instructions in between the pain medication to help enhance pain control and reduce swelling.  If you are taking the pain medication prescribed, do not take Tylenol too unless you consult with the pharmacist. Generally, the pain medications prescribed have Tylenol in them and too much Tylenol can be harmful.  You should stop the anti-inflammatory medication if you experience any stomach/GI disturbances.  Consult with your physician or your pharmacist before taking over-the-counter pain medications/anti-inflammatories. It is not uncommon to have a fever post-operatively especially in the first 24-48 hours.  Deep breathing and coughing and early ambulation will help.  Anti-inflammatories can be used for fever reduction also.  If unable to urinate 6 to 8 hours after surgery or urinating frequently in small amounts, notify the physician or go to the nearest Emergency Room.  NOTIFY YOUR PHYSICIAN IF YOU EXPERIENCE:  Numbness of toes.  Inability to move toes.  Extreme coldness, paleness or blue dis-coloration of toes.  Any pain other than from the incision, such as swelling of the leg that blocks circulation of the toes.  Follow verbal instructions from your doctor.  Medications per physician instructions as indicated on Discharge Medication Information Sheet.  You should see  ______________________for follow-up care  on   .  Phone number: _________________________  Missing your appointment/follow-up could lead to serious complications.  If you have an emergency and cannot reach your doctor, go to an Emergency Room nearest your home.

## 2022-09-22 NOTE — ANESTHESIA POSTPROCEDURE EVALUATION
Patient: Luis Masterson    Procedure Summary     Date: 09/22/22 Room / Location: [facility] OR  / [facility]    Anesthesia Start: 0816 Anesthesia Stop: 0905    Procedure: KNEE ARTHROSCOPY WITH PARTIAL MEDIAL MENISCECTOMY, POSSIBLE CHONDROPLASTY (Left Knee) Diagnosis:       Meniscus, lateral, derangement, left      (Meniscus, lateral, derangement, left [M23.301])    Surgeons: Lloyd Robles MD Provider: Micheal Obrien MD    Anesthesia Type: general ASA Status: 2          Anesthesia Type: general    Vitals  Vitals Value Taken Time   /74 09/22/22 0920   Temp 36.1 °C (97 °F) 09/22/22 0902   Pulse 80 09/22/22 0929   Resp 20 09/22/22 0920   SpO2 97 % 09/22/22 0929   Vitals shown include unvalidated device data.        Post Anesthesia Care and Evaluation    Patient location during evaluation: bedside  Patient participation: complete - patient participated  Level of consciousness: awake  Pain management: adequate    Airway patency: patent  Anesthetic complications: No anesthetic complications  PONV Status: none  Cardiovascular status: acceptable  Respiratory status: acceptable  Hydration status: acceptable    Comments: An Anesthesiologist personally participated in the most demanding procedures (including induction and emergence if applicable) in the anesthesia plan, monitored the course of anesthesia administration at frequent intervals and remained physically present and available for immediate diagnosis and treatment of emergencies.

## 2022-09-22 NOTE — ANESTHESIA PREPROCEDURE EVALUATION
Anesthesia Evaluation     Patient summary reviewed and Nursing notes reviewed                Airway   Mallampati: I  TM distance: >3 FB  Neck ROM: full  No difficulty expected  Dental      Pulmonary - negative pulmonary ROS and normal exam    breath sounds clear to auscultation  Cardiovascular - normal exam    Rhythm: regular  Rate: normal    (+) hyperlipidemia,       Neuro/Psych  (+) psychiatric history,    GI/Hepatic/Renal/Endo    (+)   liver disease, renal disease,     Musculoskeletal     (+) joint swelling,   Abdominal    Substance History - negative use     OB/GYN negative ob/gyn ROS         Other      history of cancer                    Anesthesia Plan    ASA 2     general     intravenous induction     Anesthetic plan, risks, benefits, and alternatives have been provided, discussed and informed consent has been obtained with: patient.        CODE STATUS:

## 2022-09-27 ENCOUNTER — TELEPHONE (OUTPATIENT)
Dept: ORTHOPEDIC SURGERY | Facility: CLINIC | Age: 57
End: 2022-09-27

## 2022-09-27 NOTE — TELEPHONE ENCOUNTER
Provider: DR. RUIZ    Caller: PATIENT    Relationship to Patient: SELF    Phone Number: [phone]    Reason for Call: PT. HAD LEFT KNEE SURGERY WITH DR. RUIZ ON .9/22/22  PT. STATES THAT HER PAPER SAYS PHYSICAL THERAPY, BUT SHE IS NOT SURE IF SHE IS SUPPOSED TO CALL [facility] HERSELF OR IF THE ORDER HAS BEEN SENT THERE.  SHE WORKS FOR [facility] AND WOULD LIKE TO GO TO THAT LOCATION.  PLEASE CALL PT. TO ADVISE.

## 2022-09-29 ENCOUNTER — TREATMENT (OUTPATIENT)
Dept: PHYSICAL THERAPY | Facility: CLINIC | Age: 57
End: 2022-09-29

## 2022-09-29 DIAGNOSIS — R29.898 WEAKNESS OF LEFT LOWER EXTREMITY: ICD-10-CM

## 2022-09-29 DIAGNOSIS — R26.2 DIFFICULTY WALKING: ICD-10-CM

## 2022-09-29 DIAGNOSIS — Z98.890 STATUS POST MEDIAL MENISCECTOMY OF LEFT KNEE: Primary | ICD-10-CM

## 2022-09-29 DIAGNOSIS — M25.662 DECREASED ROM OF LEFT KNEE: ICD-10-CM

## 2022-09-29 PROCEDURE — 97161 PT EVAL LOW COMPLEX 20 MIN: CPT

## 2022-09-29 PROCEDURE — 97110 THERAPEUTIC EXERCISES: CPT

## 2022-09-29 PROCEDURE — 97140 MANUAL THERAPY 1/> REGIONS: CPT

## 2022-09-29 NOTE — PROGRESS NOTES
"  Physical Therapy Initial Evaluation and Plan of Care    Patient: Luis Masterson   : 1965  Diagnosis/ICD-10 Code:  Status post medial meniscectomy of left knee [Z98.890]  Referring practitioner: Lloyd Robles MD  Date of Initial Visit: 2022  Today's Date: 2022  Patient seen for 1 sessions           Subjective Questionnaire: LEFS: 33/80      Subjective   Pt reports to therapy s/p L medial menisectomy and chodroplasty 2022. Pt reports they had a 'concrete lady' fall on their leg causing the need for surgery. Pt reports their worst pain since surgery has been a 10/10 which can occur after walking for greater than 30 minutes, sitting for greater than 1-2 hours, and standing for greater than 15 minutes. Pt reports their pain can get better w/ tylenol and motrin, ice, and elevation. Pt reports they do not like to take their pain medication that was prescribed because it makes them sick. The pt is currently experiencing 6/10 pain but it can get down to a 4/10 with OTC meds.    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 To be able to clean their house.       Objective          Neurological Testing     Additional Neurological Details  Pt has decreased sensation on the medial aspect of their lower leg, consistent with saphenous nerve distribution, where the statue that fell on them landed. Otherwise, all other landmarks consistent w/ dermatomes L2-S1 are intact to light touch sensation bilaterally.     Active Range of Motion   Left Knee   Flexion: 82 degrees with pain  Extension: 5 (lacking) degrees     Right Knee   Flexion: 130 degrees   Extension: 0 degrees     Passive Range of Motion   Left Knee   Flexion: 95 degrees with pain  Extension: 5 (lacking) degrees with pain    Patellar Mobility   Left Knee Hypomobile in the left medial, left lateral, left superior " and left inferior patellar tendon(s).     Right Knee Patellar tendons within functional limits include the medial, lateral and inferior. Hypomobile in the superior patellar tendon(s).     Strength/Myotome Testing     Left Hip   Planes of Motion   Flexion: 4    Right Hip   Planes of Motion   Flexion: 4+    Left Knee   Flexion: 3+  Extension: 3+  Quadriceps contraction: fair    Right Knee   Flexion: 4+  Extension: 5    Left Ankle/Foot   Dorsiflexion: 4    Right Ankle/Foot   Dorsiflexion: 5    Ambulation   Weight-Bearing Status   Weight-Bearing Status (Left): weight-bearing as tolerated     Additional Weight-Bearing Status Details  Pt ambulates in with B axillary crutches. Pt does put weight down while stepping with their L LE. Pt does have a shortened stance time on the L LE due to pain.      General Comments     Knee Comments  Pt has bandages covering their surgical incision. There is no increased warmth or redness around these incisions on the L knee.      See Exercise, Manual, and Modality Logs for complete treatment.       Assessment & Plan     Assessment  Impairments: abnormal coordination, abnormal gait, abnormal muscle firing, abnormal or restricted ROM, activity intolerance, impaired balance, impaired physical strength, pain with function and weight-bearing intolerance  Functional Limitations: walking, sitting, standing and stooping  Assessment details: Pt reports to physical therapy s/p L partial medial menisectomy and chondroplasty. Pt presents w/ deficits in weightbearing tolerance, ROM, strength, and activity tolerance. Pt also has self reported deficits examined by the LEFS. Pt will benefit from skilled physical therapy to address these impairments to improve upon their functional mobility, gait, and ability to perform ADLs to reach their personal goal of 'getting back to their normal thing'.    Prognosis: good    Goals  Plan Goals: KNEE PROBLEMS:     1. The patient has limited ROM of the L knee.   LTG 1:  6 weeks:  The patient will demonstrate 0 to 120 degrees of ROM for the L knee in order to allow patient to ambulate with ease and to perform daily tasks.    STATUS:  New   STG 1a: 2 weeks:  The patient will demonstrate 0 to 100 degrees of ROM for the L knee.    STATUS:  New   TREATMENT: Manual therapy, therapeutic exercise, home exercise instruction, and modalities as needed to include:  moist heat, electrical stimulation, and ice.    2. The patient has limited strength of the L knee.   LTG 2: 6 weeks: The patient will demonstrate 5 /5 strength for L knee flexion and extension in order to allow patient improved joint stability    STATUS:  New   STG 2a: 2 weeks: The patient will demonstrate 4+ /5 strength for L knee flexion and extension    STATUS:  New    TREATMENT: Manual therapy, therapeutic exercise, home exercise instruction, aquatic therapy, and modalities as needed to include:  moist heat, electrical stimulation, ultrasound, and ice.     3. The patient has gait dysfunction.   LTG 3: 6 weeks:  The patient will ambulate without assistive device, independently, for community distances with minimal limp to the L lower extremity in order to improve mobility and allow patient to perform activities such as cleaning their house with greater ease.    STATUS:  New   STG 3a: 2 weeks: Patient will be able to demonstrate independence with their HEP.     STATUS:  New   TREATMENT: Gait training, aquatic therapy, therapeutic exercise, and home exercise instruction.    4. Mobility: Walking/Moving Around Functional Limitation     LTG 4: 6 weeks:  The patient will demonstrate 1-19 % limitation by achieving a score of 65-79 on the LEFS.    STATUS:  New   STG 4 a: 2 weeks:  The patient will demonstrate 20-39 % limitation by achieving a score of 49-64 on the LEFS.      STATUS:  New   TREATMENT:  Manual therapy, therapeutic exercise, home exercise instruction, and modalities as needed to include: moist heat, electrical stimulation,  and ultrasound.      PLAN:  Therapy options: will receive skilled therapy services  Planned modality interventions: Cryotherapy, Swazi Estim and TENS  Planned therapy interventions:balance/weight-bearing training, ADL retraining, soft tissue mobilization, strengthening, stretching, therapeutic activities, manual therapy, joint mobilization, home exercise program/patient education, gait training, functional ROM exercises, flexibility, body mechanics training, postural training and neuromuscular re-education  Frequency: 2x per week  Duration in weeks: 6  Treatment plan discussed with: patient        Visit Diagnoses:    ICD-10-CM ICD-9-CM   1. Status post medial meniscectomy of left knee  Z98.890 V45.89   2. Difficulty walking  R26.2 719.7   3. Weakness of left lower extremity  R29.898 729.89   4. Decreased ROM of left knee  M25.662 719.56       History # of Personal Factors and/or Comorbidities: LOW (0)  Examination of Body System(s): # of elements: LOW (1-2)  Clinical Presentation: STABLE   Clinical Decision Making: LOW       Timed:         Manual Therapy:    8     mins  44616;     Therapeutic Exercise:    15     mins  46841;     Neuromuscular Genesis:    0    mins  38996;    Therapeutic Activity:     0     mins  39218;     Gait Trainin     mins  23494;     Ultrasound:     0     mins  18778;    Ionto                               0    mins   59968  Self Care                       0     mins   08140  Canalith Repos    0     mins 80031      Un-Timed:  Electrical Stimulation:    0     mins  20802 ( );  Dry Needling     0     mins self-pay  Traction     0     mins 23789  Low Eval     32     Mins  58349  Mod Eval     0     Mins  14571  High Eval                       0     Mins  08605  Re-Eval                           0    mins  70189    Timed Treatment:   23   mins   Total Treatment:     55   mins    PT SIGNATURE: Reid Guerrero PT, DPT    Electronically signed 2022    [place] License: PT -  [id number]    Supervised by Indio Guillermo PT, DPT [place] License PT - [id number]    Initial Certification  Certification Period: 9/29/2022 thru 12/27/2022  I certify that the therapy services are furnished while this patient is under my care.  The services outlined above are required by this patient, and will be reviewed every 90 days.     PHYSICIAN: Lloyd Robles MD  NPI: [id number]      DATE:     Please sign and return via fax to [phone]. Thank you, [facility].

## 2022-10-03 ENCOUNTER — TREATMENT (OUTPATIENT)
Dept: PHYSICAL THERAPY | Facility: CLINIC | Age: 57
End: 2022-10-03

## 2022-10-03 DIAGNOSIS — R26.2 DIFFICULTY WALKING: ICD-10-CM

## 2022-10-03 DIAGNOSIS — M25.662 DECREASED ROM OF LEFT KNEE: ICD-10-CM

## 2022-10-03 DIAGNOSIS — Z98.890 STATUS POST MEDIAL MENISCECTOMY OF LEFT KNEE: Primary | ICD-10-CM

## 2022-10-03 DIAGNOSIS — R29.898 WEAKNESS OF LEFT LOWER EXTREMITY: ICD-10-CM

## 2022-10-03 PROCEDURE — 97110 THERAPEUTIC EXERCISES: CPT

## 2022-10-03 NOTE — PROGRESS NOTES
Physical Therapy Daily Treatment Note    Patient: Luis Masterson   : 1965  Diagnosis/ICD-10 Code:  Status post medial meniscectomy of left knee [Z98.890]  Referring practitioner: Lloyd Robles MD  Date of Initial Visit: Type: THERAPY  Noted: 2022  Today's Date: 10/3/2022  Patient seen for 2 sessions           Subjective   The patient reported they have been performing their HEP 4x/day. Pt states their knee feels better and they have been able to straighten it more with less pain. Pt reports they are currently in a 2/10 pain in their R knee.     Objective   Applied bandages   See Exercise, Manual, and Modality Logs for complete treatment.     Assessment/Plan  Discussed dressings to cover surgical sites. Pt tolerates treatment well as they had no increase in pain. Discussed with pt about performing HEP ROM exs today as they were not performed in therapy today. Patient will continue to benefit from skilled physical therapy to further address their deficits in strength and ROM in order to improve upon their functional mobility and to meet their personal goals.           Timed:  Manual Therapy:    0     mins  06025;  Therapeutic Exercise:    25     mins  85483;     Neuromuscular Genesis:   0    mins  89422;    Therapeutic Activity:     0     mins  11354;     Gait Trainin     mins  53350;     Aquatics                         0      mins  75485    Un-timed:  Mechanical Traction      0     mins  21841  Electrical Stimulation:    0     mins  90506 ( );      Timed Treatment:   25   mins   Total Treatment:     25   mins    Reid Guerrero PT, DPT    Electronically signed 10/3/2022    [place] License: PT - [id number]     Supervised by Indio Guillermo PT DPT [place] License PT - [id number]

## 2022-10-04 RX ORDER — LIRAGLUTIDE 6 MG/ML
INJECTION, SOLUTION SUBCUTANEOUS
Qty: 15 ML | Refills: 1 | Status: CANCELLED | OUTPATIENT
Start: 2022-10-04

## 2022-10-05 ENCOUNTER — TELEPHONE (OUTPATIENT)
Dept: PHYSICAL THERAPY | Facility: CLINIC | Age: 57
End: 2022-10-05

## 2022-10-07 ENCOUNTER — OFFICE VISIT (OUTPATIENT)
Dept: ORTHOPEDIC SURGERY | Facility: CLINIC | Age: 57
End: 2022-10-07

## 2022-10-07 VITALS — WEIGHT: 165 LBS | OXYGEN SATURATION: 99 % | HEART RATE: 69 BPM | HEIGHT: 61 IN | BODY MASS INDEX: 31.15 KG/M2

## 2022-10-07 DIAGNOSIS — Z47.89 AFTERCARE FOLLOWING SURGERY OF THE MUSCULOSKELETAL SYSTEM: Primary | ICD-10-CM

## 2022-10-07 PROCEDURE — 99024 POSTOP FOLLOW-UP VISIT: CPT | Performed by: PHYSICIAN ASSISTANT

## 2022-10-07 NOTE — PATIENT INSTRUCTIONS
Sutures removed in office and Steri-Strips applied.  Patient educated on incision care.  Please keep incision clean and dry.  Do not soak or submerge in water until incision is fully healed.  Do not apply creams or lotions over the incision.    Continue icing as needed to help with pain and swelling.  Ice knee up to 3 or 4 times daily for no longer than 15 to 20 minutes at a time.    Continue PT to progress ROM, strength..    Follow-up in 4 weeks. No imaging needed.  Please call with questions or concerns.

## 2022-10-07 NOTE — PROGRESS NOTES
"Chief Complaint  Pain and Follow-up of the Left Knee    Subjective      Luis Masterson presents to [facility] for follow-up of left knee arthroscopy with partial medial meniscectomy and medial femoral chondroplasty performed on 9/22/2022 by Dr. Robles.  Patient presents today independently ambulatory without use of assistive device.  She is participating in outpatient physical therapy through [facility], attending twice weekly.  She has been able to achieve full knee extension and 115 degrees of knee flexion.  Reports she has had some swelling, controlled with icing.  Pain is well controlled.  She has been participating with home exercises at least 2-4 times daily.    Objective   Allergies   Allergen Reactions   • Sulfa Antibiotics Swelling     Facial and eye swelling        Vital Signs:   Pulse 69   Ht 154.9 cm (61\")   Wt 74.8 kg (165 lb)   SpO2 99%   BMI 31.18 kg/m²       Physical Exam    Constitutional: Awake, alert. Well nourished appearance.    Integumentary: Warm, dry, intact. No obvious rashes.    HENT: Atraumatic, normocephalic.   Respiratory: Non labored respirations .   Cardiovascular: Intact peripheral pulses.    Psychiatric: Normal mood and affect. A&O X3    Ortho Exam  Left knee: Sutures removed.  Well-healing surgical incisions noted.  Mild edema.  Patella is well tracking.  Knee is stable to varus and valgus stress.  Full knee extension and knee flexion to 115 degrees.  Full plantarflexion and dorsiflexion of the ankle.  Sensation intact to light touch.  Distal neurovascular intact.  Fully weightbearing with nonantalgic gait.    Imaging Results (Most Recent)     None           Assessment and Plan   Problem List Items Addressed This Visit    None     Visit Diagnoses     Aftercare following L knee arthroscopy with PMM, MFC    -  Primary        Follow Up   Return in about 4 weeks (around 11/4/2022).    Patient Instructions   Sutures removed in office and Steri-Strips " applied.  Patient educated on incision care.  Please keep incision clean and dry.  Do not soak or submerge in water until incision is fully healed.  Do not apply creams or lotions over the incision.    Continue icing as needed to help with pain and swelling.  Ice knee up to 3 or 4 times daily for no longer than 15 to 20 minutes at a time.    Continue PT to progress ROM, strength..    Follow-up in 4 weeks. No imaging needed.  Please call with questions or concerns.      Patient was given instructions and counseling regarding her condition or for health maintenance advice. Please see specific information pulled into the AVS if appropriate.

## 2022-10-11 ENCOUNTER — TREATMENT (OUTPATIENT)
Dept: PHYSICAL THERAPY | Facility: CLINIC | Age: 57
End: 2022-10-11

## 2022-10-11 DIAGNOSIS — R29.898 WEAKNESS OF LEFT LOWER EXTREMITY: ICD-10-CM

## 2022-10-11 DIAGNOSIS — R26.2 DIFFICULTY WALKING: ICD-10-CM

## 2022-10-11 DIAGNOSIS — Z98.890 STATUS POST MEDIAL MENISCECTOMY OF LEFT KNEE: Primary | ICD-10-CM

## 2022-10-11 DIAGNOSIS — M25.662 DECREASED ROM OF LEFT KNEE: ICD-10-CM

## 2022-10-11 PROCEDURE — 97530 THERAPEUTIC ACTIVITIES: CPT | Performed by: PHYSICAL THERAPIST

## 2022-10-11 PROCEDURE — 97110 THERAPEUTIC EXERCISES: CPT | Performed by: PHYSICAL THERAPIST

## 2022-10-11 PROCEDURE — 97140 MANUAL THERAPY 1/> REGIONS: CPT | Performed by: PHYSICAL THERAPIST

## 2022-10-11 NOTE — PROGRESS NOTES
"Physical Therapy Daily Treatment Note      Patient: Luis Masterson   : 1965  Referring practitioner: Lloyd Robles MD  Date of Initial Visit: Type: THERAPY  Noted: 2022  Today's Date: 10/11/2022  Patient seen for 3 sessions           Subjective  Luis Masterson reports: \"no pain just stiffness.\"        Objective   See Exercise, Manual, and Modality Logs for complete treatment.       Assessment/Plan    Visit Diagnoses:    ICD-10-CM ICD-9-CM   1. Status post medial meniscectomy of left knee  Z98.890 V45.89   2. Difficulty walking  R26.2 719.7   3. Weakness of left lower extremity  R29.898 729.89   4. Decreased ROM of left knee  M25.662 719.56       Progress per Plan of Care and Progress strengthening /stabilization /functional activity           Timed:  Manual Therapy:    14     mins  04097;  Therapeutic Exercise:    23     mins  03886;     Neuromuscular Genesis:        mins  25835;    Therapeutic Activity:     8     mins  99639;     Gait Training:           mins  36900;     Ultrasound:          mins  95866;    Electrical Stimulation:         mins  05125 ( );  Aquatics  __   mins   65596    Untimed:  Electrical Stimulation:         mins  10361 ( );  Mechanical Traction:         mins  20227;     Timed Treatment:   45   mins   Total Treatment:     45   mins    Electronically Signed:  Dasha Sena PTA  Physical Therapist Assistant    [facility] license [id number]            "

## 2022-10-13 ENCOUNTER — TREATMENT (OUTPATIENT)
Dept: PHYSICAL THERAPY | Facility: CLINIC | Age: 57
End: 2022-10-13

## 2022-10-13 DIAGNOSIS — R26.2 DIFFICULTY WALKING: ICD-10-CM

## 2022-10-13 DIAGNOSIS — M25.662 DECREASED ROM OF LEFT KNEE: ICD-10-CM

## 2022-10-13 DIAGNOSIS — Z98.890 STATUS POST MEDIAL MENISCECTOMY OF LEFT KNEE: Primary | ICD-10-CM

## 2022-10-13 DIAGNOSIS — R29.898 WEAKNESS OF LEFT LOWER EXTREMITY: ICD-10-CM

## 2022-10-13 PROCEDURE — 97110 THERAPEUTIC EXERCISES: CPT | Performed by: PHYSICAL THERAPIST

## 2022-10-13 PROCEDURE — 97530 THERAPEUTIC ACTIVITIES: CPT | Performed by: PHYSICAL THERAPIST

## 2022-10-13 NOTE — PROGRESS NOTES
"Physical Therapy Daily Treatment Note  [place] [facility] [address] [place] [address]    Patient: Luis Masterson   : 1965  Referring practitioner: Lloyd Robles MD  Date of Initial Visit: Type: THERAPY  Noted: 2022  Today's Date: 10/13/2022  Patient seen for 4 sessions           Subjective  Luis Masterson reports: she had pain \"like the day after surgery\" the day after last session here. PTA advised Luis that she had performed additional activities last session and program will be reduced today.       Objective   See Exercise, Manual, and Modality Logs for complete treatment.       Assessment/Plan  Luis had no c/o after treatment today, \"I mean it is sore but no pain.\" Plan to address strength and ROM to return to PLOF.    Visit Diagnoses:    ICD-10-CM ICD-9-CM   1. Status post medial meniscectomy of left knee  Z98.890 V45.89   2. Difficulty walking  R26.2 719.7   3. Weakness of left lower extremity  R29.898 729.89   4. Decreased ROM of left knee  M25.662 719.56       Progress per Plan of Care and Progress strengthening /stabilization /functional activity           Timed:  Manual Therapy:         mins  71286;  Therapeutic Exercise:    19     mins  94081;     Neuromuscular Genesis:        mins  96737;    Therapeutic Activity:     12     mins  56970;     Gait Training:           mins  75839;     Ultrasound:          mins  28166;    Electrical Stimulation:         mins  78632 ( );  Aquatics  __   mins   22462    Untimed:  Electrical Stimulation:         mins  77423 ( );  Mechanical Traction:         mins  96379;     Timed Treatment:   31   mins   Total Treatment:     31   mins    Electronically Signed:  Dasha Sena PTA  Physical Therapist Assistant    [place] PTA license [id number]            "

## 2022-10-18 ENCOUNTER — TREATMENT (OUTPATIENT)
Dept: PHYSICAL THERAPY | Facility: CLINIC | Age: 57
End: 2022-10-18

## 2022-10-18 DIAGNOSIS — M25.662 DECREASED ROM OF LEFT KNEE: ICD-10-CM

## 2022-10-18 DIAGNOSIS — R29.898 WEAKNESS OF LEFT LOWER EXTREMITY: ICD-10-CM

## 2022-10-18 DIAGNOSIS — Z98.890 STATUS POST MEDIAL MENISCECTOMY OF LEFT KNEE: Primary | ICD-10-CM

## 2022-10-18 DIAGNOSIS — R26.2 DIFFICULTY WALKING: ICD-10-CM

## 2022-10-18 PROCEDURE — 97110 THERAPEUTIC EXERCISES: CPT | Performed by: PHYSICAL THERAPIST

## 2022-10-18 PROCEDURE — 97140 MANUAL THERAPY 1/> REGIONS: CPT | Performed by: PHYSICAL THERAPIST

## 2022-10-18 PROCEDURE — 97530 THERAPEUTIC ACTIVITIES: CPT | Performed by: PHYSICAL THERAPIST

## 2022-10-18 NOTE — PROGRESS NOTES
"Physical Therapy Daily Treatment Note  [place] [facility] [address] [place] [address]    Patient: Luis Masterson   : 1965  Referring practitioner: Lloyd Robles MD  Date of Initial Visit: Type: THERAPY  Noted: 2022  Today's Date: 10/18/2022  Patient seen for 5 sessions           Subjective  Luis Masterson reports: pain at 3/10 initially. She commented she is doing the best she can to extend her knee with gait.     Luis related she may seek a massage.    Objective   See Exercise, Manual, and Modality Logs for complete treatment.       Assessment/Plan  At end of session, Luis commented that she had no pain. Manual work improved her knee extension, however, Luis stated \"it feels like it needs to pop and then it would go straight.\" Luis is progressing with her AROM, gross strength and functional ability at this time. Therapist directed program required to attend to returning to best possible outcome.     Visit Diagnoses:    ICD-10-CM ICD-9-CM   1. Status post medial meniscectomy of left knee  Z98.890 V45.89   2. Difficulty walking  R26.2 719.7   3. Weakness of left lower extremity  R29.898 729.89   4. Decreased ROM of left knee  M25.662 719.56       Progress per Plan of Care and Progress strengthening /stabilization /functional activity           Timed:  Manual Therapy:    8     mins  55113;  Therapeutic Exercise:    12     mins  27450;     Neuromuscular Genesis:        mins  70124;    Therapeutic Activity:     12     mins  79444;     Gait Training:           mins  46503;     Ultrasound:          mins  06589;    Electrical Stimulation:         mins  10812 ( );  Aquatics  __   mins   76084    Untimed:  Electrical Stimulation:         mins  55599 ( );  Mechanical Traction:         mins  35244;     Timed Treatment:   32   mins   Total Treatment:     32   mins    Electronically Signed:  Dasha Sena PTA  Physical Therapist Assistant    [place] PTA license [id number]            "

## 2022-10-20 ENCOUNTER — TREATMENT (OUTPATIENT)
Dept: PHYSICAL THERAPY | Facility: CLINIC | Age: 57
End: 2022-10-20

## 2022-10-20 DIAGNOSIS — R26.2 DIFFICULTY WALKING: ICD-10-CM

## 2022-10-20 DIAGNOSIS — Z98.890 STATUS POST MEDIAL MENISCECTOMY OF LEFT KNEE: Primary | ICD-10-CM

## 2022-10-20 DIAGNOSIS — M25.662 DECREASED ROM OF LEFT KNEE: ICD-10-CM

## 2022-10-20 DIAGNOSIS — R29.898 WEAKNESS OF LEFT LOWER EXTREMITY: ICD-10-CM

## 2022-10-20 PROCEDURE — 97110 THERAPEUTIC EXERCISES: CPT | Performed by: PHYSICAL THERAPIST

## 2022-10-20 PROCEDURE — 97530 THERAPEUTIC ACTIVITIES: CPT | Performed by: PHYSICAL THERAPIST

## 2022-10-20 PROCEDURE — 97140 MANUAL THERAPY 1/> REGIONS: CPT | Performed by: PHYSICAL THERAPIST

## 2022-10-20 NOTE — PROGRESS NOTES
Physical Therapy Daily Treatment Note      Patient: Luis Masterson   : 1965  Referring practitioner: Lloyd Robles MD  Date of Initial Visit: Type: THERAPY  Noted: 2022  Today's Date: 10/20/2022  Patient seen for 6 sessions           Subjective Questionnaire:       Subjective Evaluation    History of Present Illness    Subjective comment: Pt reports her L knee is getting better every day.  Pt reports tenderness at ppes anserine.         Objective   See Exercise, Manual, and Modality Logs for complete treatment.       Assessment & Plan     Assessment    Assessment details: Pt tolerated tx well and is improving.  L knee passive extension achieves -1 degree extension.  Continue with POC.        Visit Diagnoses:    ICD-10-CM ICD-9-CM   1. Status post medial meniscectomy of left knee  Z98.890 V45.89   2. Difficulty walking  R26.2 719.7   3. Weakness of left lower extremity  R29.898 729.89   4. Decreased ROM of left knee  M25.662 719.56       Progress per Plan of Care and Progress strengthening /stabilization /functional activity           Timed:  Manual Therapy:    8     mins  31873;  Therapeutic Exercise:    14     mins  69773;     Neuromuscular Genesis:        mins  36105;    Therapeutic Activity:     8     mins  38326;     Gait Training:           mins  85431;     Ultrasound:          mins  45451;    Electrical Stimulation:         mins  80650 ( );  Aquatic Therapy          mins  53231    Untimed:  Electrical Stimulation:         mins  38711 ( );  Mechanical Traction:         mins  07975;     Timed Treatment:   30   mins   Total Treatment:     38   mins    Electronically signed    Nieves Rizzo PTA  Physical Therapist Assistant    [facility] license: [id number]

## 2022-10-24 RX ORDER — LIRAGLUTIDE 6 MG/ML
INJECTION, SOLUTION SUBCUTANEOUS
Qty: 15 ML | Refills: 1 | Status: SHIPPED | OUTPATIENT
Start: 2022-10-24 | End: 2022-12-31 | Stop reason: SDUPTHER

## 2022-10-25 ENCOUNTER — TREATMENT (OUTPATIENT)
Dept: PHYSICAL THERAPY | Facility: CLINIC | Age: 57
End: 2022-10-25

## 2022-10-25 DIAGNOSIS — R29.898 WEAKNESS OF LEFT LOWER EXTREMITY: ICD-10-CM

## 2022-10-25 DIAGNOSIS — Z98.890 STATUS POST MEDIAL MENISCECTOMY OF LEFT KNEE: Primary | ICD-10-CM

## 2022-10-25 DIAGNOSIS — R26.2 DIFFICULTY WALKING: ICD-10-CM

## 2022-10-25 DIAGNOSIS — M25.662 DECREASED ROM OF LEFT KNEE: ICD-10-CM

## 2022-10-25 PROCEDURE — 97530 THERAPEUTIC ACTIVITIES: CPT

## 2022-10-25 NOTE — PROGRESS NOTES
Progress Note   [place] [facility]: [address] [place] [address]      Patient: Luis Masterson   : 1965  Diagnosis/ICD-10 Code:  Status post medial meniscectomy of left knee [Z98.890]  Referring practitioner: Lloyd Robles MD  Date of Initial Visit: Type: THERAPY  Noted: 2022  Today's Date: 10/25/2022  Patient seen for 7 sessions      Subjective:   Subjective Questionnaire: LEFS: 47  Clinical Progress: improved  Home Program Compliance: Yes  Treatment has included: balance/weight-bearing training, ADL retraining, soft tissue mobilization, strengthening, stretching, therapeutic activities, manual therapy, joint mobilization, home exercise program/patient education, gait training, functional ROM exercises, flexibility, body mechanics training, postural training and neuromuscular re-education    Subjective   Pt reported to therapy s/p L medial menisectomy and chodroplasty 2022. Pt reports their knee is 'getting there'. Pt is currently experiencing more of an ache. Pt reports they are no longer taking pain medication. Pt is struggling with impact activities such as running and jumping as well as deep squats.     Objective   Active Range of Motion   Left Knee   Flexion: 110 degrees   Extension: 0 (lacking) degrees      Right Knee   Flexion: 130 degrees   Extension: 0 degrees      Passive Range of Motion   Left Knee   Flexion: 114 degrees with pain  Extension: 2 (lacking) degrees     Patellar Mobility   Left Knee Hypomobile in the left medial, left lateral, left superior and left inferior patellar tendon(s).      Strength/Myotome Testing      Left Hip   Planes of Motion   Flexion: 4+     Right Hip   Planes of Motion   Flexion: 5     Left Knee   Flexion: 4+  Extension: 5  Quadriceps contraction: fair     Right Knee   Flexion: 5  Extension: 5     Left Ankle/Foot   Dorsiflexion: 5     Right Ankle/Foot   Dorsiflexion: 5    Ambulation   Pt has a slight limp favoring their L LE. Pt is not using an  AD to walk at this time.     See Exercise, Manual, and Modality Logs for complete treatment.     Assessment/Plan  Pt reported to therapy s/p L medial menisectomy and chodroplasty 09/22/2022. Pt has shown progression in both ROM and strength at this time. Pt also shows progression in their LEFS score. Plan to start incorporating impact driven exercise to tolerance such as jumping and jogging. Patient will continue to benefit from skilled physical therapy to further address their deficits in strength and ROM in order to improve upon their functional mobility and to meet their personal goals.       Goals  Plan Goals: KNEE PROBLEMS:     1. The patient has limited ROM of the L knee.              LTG 1: 6 weeks:  The patient will demonstrate 0 to 120 degrees of ROM for the L knee in order to allow patient to ambulate with ease and to perform daily tasks.                          STATUS:  progressing              STG 1a: 2 weeks:  The patient will demonstrate 0 to 100 degrees of ROM for the L knee.                          STATUS:  met               TREATMENT: Manual therapy, therapeutic exercise, home exercise instruction, and modalities as needed to include:  moist heat, electrical stimulation, and ice.    2. The patient has limited strength of the L knee.              LTG 2: 6 weeks: The patient will demonstrate 5 /5 strength for L knee flexion and extension in order to allow patient improved joint stability                          STATUS:  progressing              STG 2a: 2 weeks: The patient will demonstrate 4+ /5 strength for L knee flexion and extension                          STATUS:  met                TREATMENT: Manual therapy, therapeutic exercise, home exercise instruction, aquatic therapy, and modalities as needed to include:  moist heat, electrical stimulation, ultrasound, and ice.                3. The patient has gait dysfunction.              LTG 3: 6 weeks:  The patient will ambulate without assistive  device, independently, for community distances with minimal limp to the L lower extremity in order to improve mobility and allow patient to perform activities such as cleaning their house with greater ease.                          STATUS:  met              STG 3a: 2 weeks: Patient will be able to demonstrate independence with their HEP.                           STATUS:  met              TREATMENT: Gait training, aquatic therapy, therapeutic exercise, and home exercise instruction.    4. Mobility: Walking/Moving Around Functional Limitation                               LTG 4: 6 weeks:  The patient will demonstrate 1-19 % limitation by achieving a score of 65-79 on the LEFS.                          STATUS:  progressing              STG 4 a: 2 weeks:  The patient will demonstrate 20-39 % limitation by achieving a score of 49-64 on the LEFS.                            STATUS:  progressing              TREATMENT:  Manual therapy, therapeutic exercise, home exercise instruction, and modalities as needed to include: moist heat, electrical stimulation, and ultrasound.  Progress toward previous goals: Partially Met      Recommendations: Continue as planned  Timeframe: 1 month  Prognosis to achieve goals: good    PT Signature: Reid Guerrero PT, DPT    Electronically signed 10/25/2022    [place] License: PT - [id number]       Supervised by Pino Kwon PT, DPT [place] License PT - [id number]      Timed:  Manual Therapy:    0     mins  00094;  Therapeutic Exercise:    0     mins  76766;     Neuromuscular Genesis:    0    mins  59521;    Therapeutic Activity:     15     mins  62241;     Gait Trainin     mins  58025;     Aquatics                         0      mins  78075    Un-timed:  Mechanical Traction      0     mins  41042  Dry Needling     0     mins self-pay  Electrical Stimulation:    0     mins  03247 ( );    Timed Treatment:   15   mins   Total Treatment:     30   mins

## 2022-10-27 ENCOUNTER — TREATMENT (OUTPATIENT)
Dept: PHYSICAL THERAPY | Facility: CLINIC | Age: 57
End: 2022-10-27

## 2022-10-27 DIAGNOSIS — Z98.890 STATUS POST MEDIAL MENISCECTOMY OF LEFT KNEE: Primary | ICD-10-CM

## 2022-10-27 DIAGNOSIS — R26.2 DIFFICULTY WALKING: ICD-10-CM

## 2022-10-27 DIAGNOSIS — M25.662 DECREASED ROM OF LEFT KNEE: ICD-10-CM

## 2022-10-27 PROCEDURE — 97112 NEUROMUSCULAR REEDUCATION: CPT | Performed by: PHYSICAL THERAPIST

## 2022-10-27 PROCEDURE — 97110 THERAPEUTIC EXERCISES: CPT | Performed by: PHYSICAL THERAPIST

## 2022-10-27 PROCEDURE — 97530 THERAPEUTIC ACTIVITIES: CPT | Performed by: PHYSICAL THERAPIST

## 2022-10-27 NOTE — PROGRESS NOTES
Outpatient Physical Therapy  [address] [facility], [place] [address]                            Physical Therapy Daily Treatment Note    Patient: Luis Masterson   : 1965  Diagnosis/ICD-10 Code:  Status post medial meniscectomy of left knee [Z98.890]  Referring practitioner: Lloyd Robles MD  Date of Initial Visit: Type: THERAPY  Noted: 2022  Today's Date: 10/27/2022  Patient seen for 8 sessions             Subjective   Luis Masterson reports: no pain in the knee, states that she is a little sore especially following PT sessions.    Pain: 0/10 pain,     Objective   No complaints of increased pain or discomfort.  Increased difficulty with weighted LAQs.     See Exercise, Manual, and Modality Logs for complete treatment.     Assessment/Plan  Luis progressing as evident by decreased overall knee pain. Pt tolerated exercises well, no complaints of increased pain or discomfort. Pt would benefit from skilled PT to address Range of Motion  and Strength deficits, pain management and any concerns with ADLs.         Progress per Plan of Care         Timed:  Manual Therapy:         mins  13274;  Therapeutic Exercise:    12     mins  74481;     Neuromuscular Genesis:    8    mins  42114;    Therapeutic Activity:     10     mins  73429;     Gait Training:           mins  31728;    Aquatic Therapy:          mins  12902;       Untimed:  Electrical Stimulation:         mins  93883 ( );  Mechanical Traction:         mins  83184;       Timed Treatment:   30   mins   Total Treatment:     30   mins      Electronically signed:   Bethany Kwon PTA  Physical Therapist Assistant  [facility] License #: [id number]

## 2022-11-01 ENCOUNTER — TREATMENT (OUTPATIENT)
Dept: PHYSICAL THERAPY | Facility: CLINIC | Age: 57
End: 2022-11-01

## 2022-11-01 DIAGNOSIS — R29.898 WEAKNESS OF LEFT LOWER EXTREMITY: ICD-10-CM

## 2022-11-01 DIAGNOSIS — R26.2 DIFFICULTY WALKING: ICD-10-CM

## 2022-11-01 DIAGNOSIS — Z98.890 STATUS POST MEDIAL MENISCECTOMY OF LEFT KNEE: Primary | ICD-10-CM

## 2022-11-01 DIAGNOSIS — M25.662 DECREASED ROM OF LEFT KNEE: ICD-10-CM

## 2022-11-01 PROCEDURE — 97110 THERAPEUTIC EXERCISES: CPT | Performed by: PHYSICAL THERAPIST

## 2022-11-01 PROCEDURE — 97530 THERAPEUTIC ACTIVITIES: CPT | Performed by: PHYSICAL THERAPIST

## 2022-11-01 NOTE — PROGRESS NOTES
"Physical Therapy Daily Treatment Note  [place] [facility] [address] [place] [address]    Patient: Luis Masterson   : 1965  Referring practitioner: Lloyd Robles MD  Date of Initial Visit: Type: THERAPY  Noted: 2022  Today's Date: 2022  Patient seen for 9 sessions           Subjective  Luis Masterson reports: \"no pain just still can't get the knee straight.\" Discussed/performed trial use of shopping bag to passively stretch knee into extension.       Objective   See Exercise, Manual, and Modality Logs for complete treatment.       Assessment/Plan  Luis did well with all exercises today, advancing resistance with some of her program, revealing improved gross strength. She is to add static knee extension stretch to HEP.  Strength progression evident but remaining lack of end range extension L knee remains.     Visit Diagnoses:    ICD-10-CM ICD-9-CM   1. Status post medial meniscectomy of left knee  Z98.890 V45.89   2. Difficulty walking  R26.2 719.7   3. Decreased ROM of left knee  M25.662 719.56   4. Weakness of left lower extremity  R29.898 729.89       Progress per Plan of Care and Progress strengthening /stabilization /functional activity           Timed:  Manual Therapy:         mins  65530;  Therapeutic Exercise:    23     mins  51330;     Neuromuscular Genesis:        mins  14243;    Therapeutic Activity:     8     mins  98376;     Gait Training:           mins  33580;     Ultrasound:          mins  33750;    Electrical Stimulation:         mins  21578 ( );  Aquatics  __   mins   14287    Untimed:  Electrical Stimulation:         mins  29818 ( );  Mechanical Traction:         mins  69038;     Timed Treatment:   31   mins   Total Treatment:     31   mins    Electronically Signed:  Dasha Sena PTA  Physical Therapist Assistant    [place] PTA license [id number]            "

## 2022-11-04 ENCOUNTER — OFFICE VISIT (OUTPATIENT)
Dept: ORTHOPEDIC SURGERY | Facility: CLINIC | Age: 57
End: 2022-11-04

## 2022-11-04 ENCOUNTER — TREATMENT (OUTPATIENT)
Dept: PHYSICAL THERAPY | Facility: CLINIC | Age: 57
End: 2022-11-04

## 2022-11-04 VITALS — HEIGHT: 61 IN | OXYGEN SATURATION: 96 % | WEIGHT: 165 LBS | BODY MASS INDEX: 31.15 KG/M2

## 2022-11-04 DIAGNOSIS — R29.898 WEAKNESS OF LEFT LOWER EXTREMITY: ICD-10-CM

## 2022-11-04 DIAGNOSIS — Z47.89 AFTERCARE FOLLOWING SURGERY OF THE MUSCULOSKELETAL SYSTEM: Primary | ICD-10-CM

## 2022-11-04 DIAGNOSIS — M25.662 DECREASED ROM OF LEFT KNEE: ICD-10-CM

## 2022-11-04 DIAGNOSIS — Z98.890 STATUS POST MEDIAL MENISCECTOMY OF LEFT KNEE: Primary | ICD-10-CM

## 2022-11-04 DIAGNOSIS — R26.2 DIFFICULTY WALKING: ICD-10-CM

## 2022-11-04 PROCEDURE — 97112 NEUROMUSCULAR REEDUCATION: CPT | Performed by: PHYSICAL THERAPIST

## 2022-11-04 PROCEDURE — 97530 THERAPEUTIC ACTIVITIES: CPT | Performed by: PHYSICAL THERAPIST

## 2022-11-04 PROCEDURE — 97110 THERAPEUTIC EXERCISES: CPT | Performed by: PHYSICAL THERAPIST

## 2022-11-04 PROCEDURE — 99024 POSTOP FOLLOW-UP VISIT: CPT | Performed by: PHYSICIAN ASSISTANT

## 2022-11-04 NOTE — PATIENT INSTRUCTIONS
Patient advised to continue PT to completion. Continue home exercises. Continue icing and elevation of knee as needed for pain and swelling.     Follow up in 6 weeks. Can consider steroid injection, if needed. No imaging. Call with changes or concerns.

## 2022-11-04 NOTE — PROGRESS NOTES
"Chief Complaint  Pain and Follow-up of the Left Knee    Subjective      Luis Masterson presents to [facility] for follow-up of left knee arthroscopy with partial medial meniscectomy and medial femoral chondroplasty performed on 9/22/2022 by Dr. Robles.  Patient presents independently ambulatory without use of assistive device.  She remains in outpatient physical therapy at [facility], attending twice weekly.  Reports she has continued discomfort, particularly after PT sessions and has noted significant difficulties with going up or down stairs.    Objective   Allergies   Allergen Reactions   • Sulfa Antibiotics Swelling     Facial and eye swelling        Vital Signs:   Ht 154.9 cm (61\")   Wt 74.8 kg (165 lb)   SpO2 96%   BMI 31.18 kg/m²       Physical Exam    Constitutional: Awake, alert. Well nourished appearance.    Integumentary: Warm, dry, intact. No obvious rashes.    HENT: Atraumatic, normocephalic.   Respiratory: Non labored respirations .   Cardiovascular: Intact peripheral pulses.    Psychiatric: Normal mood and affect. A&O X3    Ortho Exam  Left knee: Well-healed surgical scars noted.  Mild edema.  Patella is well tracking.  Knee is stable to varus and valgus stress.  Full knee extension and flexion to 115 degrees.  Full plantarflexion and dorsiflexion of the ankle.  Sensation intact to light touch.  Distal neurovascular intact.  Fully weightbearing with nonantalgic gait.    Imaging Results (Most Recent)     None           Assessment and Plan   Problem List Items Addressed This Visit    None  Visit Diagnoses     Aftercare following L knee arthroscopy with PMM, RIVERA    -  Primary        Follow Up   Return in about 6 weeks (around 12/16/2022).    Patient Instructions   Patient advised to continue PT to completion. Continue home exercises. Continue icing and elevation of knee as needed for pain and swelling.     Follow up in 6 weeks. Can consider steroid injection, if needed. " No imaging. Call with changes or concerns.     Patient was given instructions and counseling regarding her condition or for health maintenance advice. Please see specific information pulled into the AVS if appropriate.

## 2022-11-04 NOTE — PROGRESS NOTES
Outpatient Physical Therapy  [address] [facility], [place] [address]                            Physical Therapy Daily Treatment Note    Patient: Luis Masterson   : 1965  Diagnosis/ICD-10 Code:  Status post medial meniscectomy of left knee [Z98.890]  Referring practitioner: Lloyd Robles MD  Date of Initial Visit: Type: THERAPY  Noted: 2022  Today's Date: 2022  Patient seen for 10 sessions             Subjective   Luis Masterson reports: walking up and down stairs for 30min yesterday with a friend, felt really good while climbing the stairs. States having increasing pain throughout the rest of the day yesterday, even having trouble sleeping last night. Reports knee being uncomfortable currently.     Objective   No complaints of increased pain or discomfort.    See Exercise, Manual, and Modality Logs for complete treatment.     Assessment/Plan  Luis still experiencing increased L knee pain, especially following lots of stairs. Pt tolerated exercises well, not complaints of increased pain or discomfort. Pt would benefit from skilled PT to address Range of Motion  and Strength deficits, pain management and any concerns with ADLs.       Progress per Plan of Care         Timed:  Manual Therapy:         mins  95392;  Therapeutic Exercise:    12     mins  19261;     Neuromuscular Genesis:    8    mins  09495;    Therapeutic Activity:     10     mins  98931;     Gait Training:           mins  06441;    Aquatic Therapy:          mins  99835;       Untimed:  Electrical Stimulation:         mins  07638 ( );  Mechanical Traction:         mins  29962;       Timed Treatment:   30   mins   Total Treatment:     30   mins      Electronically signed:   Bethany Kwon PTA  Physical Therapist Assistant  [facility] License #: [id number]

## 2022-11-10 ENCOUNTER — TREATMENT (OUTPATIENT)
Dept: PHYSICAL THERAPY | Facility: CLINIC | Age: 57
End: 2022-11-10

## 2022-11-10 DIAGNOSIS — M25.662 DECREASED ROM OF LEFT KNEE: ICD-10-CM

## 2022-11-10 DIAGNOSIS — Z98.890 STATUS POST MEDIAL MENISCECTOMY OF LEFT KNEE: Primary | ICD-10-CM

## 2022-11-10 DIAGNOSIS — R29.898 WEAKNESS OF LEFT LOWER EXTREMITY: ICD-10-CM

## 2022-11-10 DIAGNOSIS — R26.2 DIFFICULTY WALKING: ICD-10-CM

## 2022-11-10 PROCEDURE — 97140 MANUAL THERAPY 1/> REGIONS: CPT | Performed by: PHYSICAL THERAPIST

## 2022-11-10 PROCEDURE — 97110 THERAPEUTIC EXERCISES: CPT | Performed by: PHYSICAL THERAPIST

## 2022-11-10 PROCEDURE — 97530 THERAPEUTIC ACTIVITIES: CPT | Performed by: PHYSICAL THERAPIST

## 2022-11-10 NOTE — PROGRESS NOTES
"Physical Therapy Daily Treatment Note  [place] [facility] [address] [place] [address]    Patient: Luis Masterson   : 1965  Referring practitioner: Lloyd Robles MD  Date of Initial Visit: Type: THERAPY  Noted: 2022  Today's Date: 11/10/2022  Patient seen for 11 sessions           Subjective  Luis Masterson reports: she is to return to work tonight. She saw ortho last Friday. Discussion with PRASANNA Guerrero PT determined that we will continue QW x2 to determine further needs/tolerance for RTW/ADL's.    Objective   LEFS scored 64; 20-39%.  L knee ROM 2-123, long sitting    See Exercise, Manual, and Modality Logs for complete treatment.       Assessment/Plan  Lusi noted that she felt better after the soft tissue work, \"exercises feel easier and it feels looser.\" Definite improvement in her squat ability today. Luis is to attend QW x 2 to assure her ability to tolerated RTW without complications.     Visit Diagnoses:    ICD-10-CM ICD-9-CM   1. Status post medial meniscectomy of left knee  Z98.890 V45.89   2. Difficulty walking  R26.2 719.7   3. Decreased ROM of left knee  M25.662 719.56   4. Weakness of left lower extremity  R29.898 729.89       Progress per Plan of Care and Progress strengthening /stabilization /functional activity           Timed:  Manual Therapy:    8     mins  30152;  Therapeutic Exercise:    14     mins  64450;     Neuromuscular Genesis:        mins  50270;    Therapeutic Activity:     10     mins  11932;     Gait Training:           mins  87723;     Ultrasound:          mins  75410;    Electrical Stimulation:         mins  80351 ( );  Aquatics  __   mins   18713    Untimed:  Electrical Stimulation:         mins  71020 ( );  Mechanical Traction:         mins  53240;     Timed Treatment:  32    mins   Total Treatment:    32    mins    Electronically Signed:  Dasha Sena PTA  Physical Therapist Assistant    [place] PTA license [id number]            "

## 2023-01-03 RX ORDER — LIRAGLUTIDE 6 MG/ML
INJECTION, SOLUTION SUBCUTANEOUS
Qty: 15 ML | Refills: 1 | Status: CANCELLED | OUTPATIENT
Start: 2023-01-03

## 2023-01-05 RX ORDER — LIRAGLUTIDE 6 MG/ML
INJECTION, SOLUTION SUBCUTANEOUS
Qty: 15 ML | Refills: 1 | Status: SHIPPED | OUTPATIENT
Start: 2023-01-05 | End: 2023-02-24

## 2023-01-16 ENCOUNTER — OFFICE VISIT (OUTPATIENT)
Dept: ORTHOPEDIC SURGERY | Facility: CLINIC | Age: 58
End: 2023-01-16
Payer: COMMERCIAL

## 2023-01-16 VITALS — HEIGHT: 61 IN | WEIGHT: 174 LBS | HEART RATE: 82 BPM | OXYGEN SATURATION: 99 % | BODY MASS INDEX: 32.85 KG/M2

## 2023-01-16 DIAGNOSIS — Z47.89 AFTERCARE FOLLOWING SURGERY OF THE MUSCULOSKELETAL SYSTEM: Primary | ICD-10-CM

## 2023-01-16 PROCEDURE — 20610 DRAIN/INJ JOINT/BURSA W/O US: CPT | Performed by: PHYSICIAN ASSISTANT

## 2023-01-16 PROCEDURE — 99213 OFFICE O/P EST LOW 20 MIN: CPT | Performed by: PHYSICIAN ASSISTANT

## 2023-01-16 RX ADMIN — TRIAMCINOLONE ACETONIDE 40 MG: 40 INJECTION, SUSPENSION INTRA-ARTICULAR; INTRAMUSCULAR at 15:35

## 2023-01-16 RX ADMIN — LIDOCAINE HYDROCHLORIDE 5 ML: 10 INJECTION, SOLUTION INFILTRATION; PERINEURAL at 15:35

## 2023-01-16 NOTE — PATIENT INSTRUCTIONS
Patient is having continued pain. Advised to continue home exercises. Patient would like to proceed with left knee steroid injection, administered in office today. Will seek insurance approval for visco, if needed.     Follow up in 6 weeks. Call with changes or concerns.

## 2023-01-16 NOTE — PROGRESS NOTES
"Chief Complaint  Follow-up of the Left Knee    Subjective      Luis Masterson presents to [facility] for follow-up of left knee arthroscopy with partial medial meniscectomy and medial femoral chondroplasty performed on 9/22/2022 by Dr. Robles.  She presents independently ambulatory without use of assistive device.  Patient was discharged from outpatient physical therapy approximately 1 month ago and has continued home exercise program since discharge.  States that she has continued difficulties going down the stairs, but \"I make myself do the stairs\".  Continues to complain of pain along the medial joint line.    Objective   Allergies   Allergen Reactions   • Sulfa Antibiotics Swelling     Facial and eye swelling        Vital Signs:   Pulse 82   Ht 154.9 cm (61\")   Wt 78.9 kg (174 lb)   SpO2 99%   BMI 32.88 kg/m²       Physical Exam    Constitutional: Awake, alert. Well nourished appearance.    Integumentary: Warm, dry, intact. No obvious rashes.    HENT: Atraumatic, normocephalic.   Respiratory: Non labored respirations .   Cardiovascular: Intact peripheral pulses.    Psychiatric: Normal mood and affect. A&O X3    Ortho Exam  Left knee: Well-healed surgical scars noted.  Skin is warm, dry, and intact.  Patella is well tracking and knee is stable to varus and valgus stress.  Full knee extension and knee flexion to 125 degrees.  Full plantarflexion and dorsiflexion of the ankle.  Sensation intact to light touch.  Distal neurovascular intact.  Tenderness to palpation of medial joint line.  Mild edema.  Patient is fully weightbearing with nonantalgic gait.    Imaging Results (Most Recent)     None           Large Joint Arthrocentesis: L knee  Date/Time: 1/16/2023 3:35 PM  Consent given by: patient  Site marked: site marked  Timeout: Immediately prior to procedure a time out was called to verify the correct patient, procedure, equipment, support staff and site/side marked as required "   Supporting Documentation  Indications: pain   Procedure Details  Location: knee - L knee  Needle gauge: 21G.  Medications administered: 40 mg triamcinolone acetonide 40 MG/ML; 5 mL lidocaine 1 %  Patient tolerance: patient tolerated the procedure well with no immediate complications              Assessment and Plan   Problem List Items Addressed This Visit    None  Visit Diagnoses     Aftercare following L knee arthroscopy with PMM, MFC    -  Primary          Follow Up   Return in about 6 weeks (around 2/27/2023).  Patient is a former smoker.  Encouraged continued tobacco cessation.  Did not discuss options for smoking cessation.      Patient Instructions   Patient is having continued pain. Advised to continue home exercises. Patient would like to proceed with left knee steroid injection, administered in office today. Will seek insurance approval for visco, if needed.     Follow up in 6 weeks. Call with changes or concerns.     Patient was given instructions and counseling regarding her condition or for health maintenance advice. Please see specific information pulled into the AVS if appropriate.

## 2023-01-18 NOTE — OP NOTE
KNEE ARTHROSCOPY WITH MENISCECTOMY  Procedure Report    Patient Name:  Luis Masterson  YOB: 1965    Date of Surgery:  9/22/2022     Indications: See H&P pre-op Diagnosis:   Meniscus, lateral, derangement, left [M23.301]   Left knee medial meniscus tear       Post-Op Diagnosis Codes:     * Meniscus, lateral, derangement, left [M23.301]   Chondromalacia medial femoral condyle    Procedure/CPT® Codes:      Procedure(s):  Left KNEE ARTHROSCOPY WITH PARTIAL MEDIAL MENISCECTOMY,  CHONDROPLASTY medial femoral condyle    Staff:  Surgeon(s):  Lloyd Robles MD    Assistant: Ry Howell    Anesthesia: General    Estimated Blood Loss: minimal    Implants:    Nothing was implanted during the procedure    Specimen:          None        Findings: See description    Complications: None    Description of Procedure: Surgical timeout was called. Operative extremity was correctly identified in the operating room suite. Patient was administered antibiotics per the SCIP protocol. The knee was examined under anesthesia. Lachman test was negative. Anterior and posterior drawer signs were negative. There was no medial or lateral instability. Pivot shift sign was negative. The leg was prepped and draped and applied an arthroscopic leg bruce. The joint was examined in a systematic fashion. Arthroscopic portals were established. The patellofemoral joint was visualized. Findings in the patellofemoral joint included no significant chondromalacia.      The scope was then repositioned into the medial joint space. The intraoperative findings of the medial joint space included tear of the posterior horn mid body medial meniscus debrided basket and shaver.  Grade 2 grade 3 changes chondromalacia and a chondroplasty was completed.  The ACL and PCL were taut and intact with probing. The scope was then introduced into the lateral joint space. The intraoperative findings on the lateral joint space included pristine to probing.  The  medial and lateral gutters were carefully inspected and some loose fragments of articular cartilage were identified and removed. Arthroscopic fluid was evacuated from the joint. The portals were carefully approximated. Intraarticular analgesic was injected for postoperative analgesia. Occlusive dressing was applied. Sponge, gauze and needle count was correct. No complications were encountered.  Patient was reversed from anesthetic and taken from the operating room to the recovery room in stable, satisfactory condition.       Lloyd Robles MD     Date: 9/24/2022  Time: 12:32 EDT     Zyclara Counseling:  I discussed with the patient the risks of imiquimod including but not limited to erythema, scaling, itching, weeping, crusting, and pain.  Patient understands that the inflammatory response to imiquimod is variable from person to person and was educated regarded proper titration schedule.  If flu-like symptoms develop, patient knows to discontinue the medication and contact us.

## 2023-01-19 RX ORDER — LIDOCAINE HYDROCHLORIDE 10 MG/ML
5 INJECTION, SOLUTION INFILTRATION; PERINEURAL
Status: COMPLETED | OUTPATIENT
Start: 2023-01-16 | End: 2023-01-16

## 2023-01-19 RX ORDER — TRIAMCINOLONE ACETONIDE 40 MG/ML
40 INJECTION, SUSPENSION INTRA-ARTICULAR; INTRAMUSCULAR
Status: COMPLETED | OUTPATIENT
Start: 2023-01-16 | End: 2023-01-16

## 2023-01-26 ENCOUNTER — DOCUMENTATION (OUTPATIENT)
Dept: PHYSICAL THERAPY | Facility: CLINIC | Age: 58
End: 2023-01-26
Payer: COMMERCIAL

## 2023-02-23 PROBLEM — N90.3 VULVAR INTRAEPITHELIAL NEOPLASIA (VIN): Status: ACTIVE | Noted: 2022-03-14

## 2023-02-24 ENCOUNTER — OFFICE VISIT (OUTPATIENT)
Dept: FAMILY MEDICINE CLINIC | Facility: CLINIC | Age: 58
End: 2023-02-24
Payer: COMMERCIAL

## 2023-02-24 VITALS
HEIGHT: 61 IN | TEMPERATURE: 97.5 F | HEART RATE: 80 BPM | SYSTOLIC BLOOD PRESSURE: 142 MMHG | OXYGEN SATURATION: 99 % | BODY MASS INDEX: 32.66 KG/M2 | DIASTOLIC BLOOD PRESSURE: 88 MMHG | WEIGHT: 173 LBS

## 2023-02-24 DIAGNOSIS — E78.2 MIXED HYPERLIPIDEMIA: Primary | ICD-10-CM

## 2023-02-24 DIAGNOSIS — E66.09 CLASS 1 OBESITY DUE TO EXCESS CALORIES WITHOUT SERIOUS COMORBIDITY WITH BODY MASS INDEX (BMI) OF 32.0 TO 32.9 IN ADULT: ICD-10-CM

## 2023-02-24 DIAGNOSIS — N18.9 CHRONIC KIDNEY DISEASE, UNSPECIFIED CKD STAGE: ICD-10-CM

## 2023-02-24 DIAGNOSIS — R73.01 IMPAIRED FASTING GLUCOSE: ICD-10-CM

## 2023-02-24 DIAGNOSIS — R23.2 HOT FLASHES: ICD-10-CM

## 2023-02-24 DIAGNOSIS — R61 NIGHT SWEATS: ICD-10-CM

## 2023-02-24 DIAGNOSIS — Z12.31 SCREENING MAMMOGRAM FOR BREAST CANCER: ICD-10-CM

## 2023-02-24 DIAGNOSIS — F41.9 ANXIETY: ICD-10-CM

## 2023-02-24 DIAGNOSIS — Z11.59 NEED FOR HEPATITIS C SCREENING TEST: ICD-10-CM

## 2023-02-24 DIAGNOSIS — R74.8 ELEVATED LIVER ENZYMES: ICD-10-CM

## 2023-02-24 DIAGNOSIS — J30.2 SEASONAL ALLERGIES: ICD-10-CM

## 2023-02-24 DIAGNOSIS — Z12.11 SCREENING FOR MALIGNANT NEOPLASM OF COLON: ICD-10-CM

## 2023-02-24 DIAGNOSIS — Z13.29 SCREENING FOR THYROID DISORDER: ICD-10-CM

## 2023-02-24 PROBLEM — E66.811 CLASS 1 OBESITY DUE TO EXCESS CALORIES WITHOUT SERIOUS COMORBIDITY WITH BODY MASS INDEX (BMI) OF 32.0 TO 32.9 IN ADULT: Status: ACTIVE | Noted: 2023-02-24

## 2023-02-24 LAB
ALBUMIN SERPL-MCNC: 4.5 G/DL (ref 3.5–5.2)
ALBUMIN UR-MCNC: <1.2 MG/DL
ALBUMIN/GLOB SERPL: 2 G/DL
ALP SERPL-CCNC: 59 U/L (ref 39–117)
ALT SERPL W P-5'-P-CCNC: 25 U/L (ref 1–33)
ANION GAP SERPL CALCULATED.3IONS-SCNC: 9 MMOL/L (ref 5–15)
AST SERPL-CCNC: 23 U/L (ref 1–32)
BASOPHILS # BLD AUTO: 0.06 10*3/MM3 (ref 0–0.2)
BASOPHILS NFR BLD AUTO: 1.1 % (ref 0–1.5)
BILIRUB SERPL-MCNC: 0.7 MG/DL (ref 0–1.2)
BILIRUB UR QL STRIP: NEGATIVE
BUN SERPL-MCNC: 11 MG/DL (ref 6–20)
BUN/CREAT SERPL: 11.7 (ref 7–25)
CALCIUM SPEC-SCNC: 9.7 MG/DL (ref 8.6–10.5)
CHLORIDE SERPL-SCNC: 100 MMOL/L (ref 98–107)
CHOLEST SERPL-MCNC: 271 MG/DL (ref 0–200)
CLARITY UR: CLEAR
CO2 SERPL-SCNC: 29 MMOL/L (ref 22–29)
COLOR UR: YELLOW
CREAT SERPL-MCNC: 0.94 MG/DL (ref 0.57–1)
CREAT UR-MCNC: 255.3 MG/DL
DEPRECATED RDW RBC AUTO: 43.6 FL (ref 37–54)
EGFRCR SERPLBLD CKD-EPI 2021: 71.8 ML/MIN/1.73
EOSINOPHIL # BLD AUTO: 0.08 10*3/MM3 (ref 0–0.4)
EOSINOPHIL NFR BLD AUTO: 1.5 % (ref 0.3–6.2)
ERYTHROCYTE [DISTWIDTH] IN BLOOD BY AUTOMATED COUNT: 12.6 % (ref 12.3–15.4)
GLOBULIN UR ELPH-MCNC: 2.3 GM/DL
GLUCOSE SERPL-MCNC: 90 MG/DL (ref 65–99)
GLUCOSE UR STRIP-MCNC: NEGATIVE MG/DL
HBA1C MFR BLD: 5.4 % (ref 4.8–5.6)
HCT VFR BLD AUTO: 44.2 % (ref 34–46.6)
HCV AB SER DONR QL: NORMAL
HDLC SERPL-MCNC: 70 MG/DL (ref 40–60)
HGB BLD-MCNC: 14.5 G/DL (ref 12–15.9)
HGB UR QL STRIP.AUTO: NEGATIVE
IMM GRANULOCYTES # BLD AUTO: 0 10*3/MM3 (ref 0–0.05)
IMM GRANULOCYTES NFR BLD AUTO: 0 % (ref 0–0.5)
KETONES UR QL STRIP: NEGATIVE
LDLC SERPL CALC-MCNC: 187 MG/DL (ref 0–100)
LDLC/HDLC SERPL: 2.63 {RATIO}
LEUKOCYTE ESTERASE UR QL STRIP.AUTO: NEGATIVE
LYMPHOCYTES # BLD AUTO: 1.7 10*3/MM3 (ref 0.7–3.1)
LYMPHOCYTES NFR BLD AUTO: 30.9 % (ref 19.6–45.3)
MCH RBC QN AUTO: 30.5 PG (ref 26.6–33)
MCHC RBC AUTO-ENTMCNC: 32.8 G/DL (ref 31.5–35.7)
MCV RBC AUTO: 92.9 FL (ref 79–97)
MICROALBUMIN/CREAT UR: NORMAL MG/G{CREAT}
MONOCYTES # BLD AUTO: 0.46 10*3/MM3 (ref 0.1–0.9)
MONOCYTES NFR BLD AUTO: 8.4 % (ref 5–12)
NEUTROPHILS NFR BLD AUTO: 3.2 10*3/MM3 (ref 1.7–7)
NEUTROPHILS NFR BLD AUTO: 58.1 % (ref 42.7–76)
NITRITE UR QL STRIP: NEGATIVE
NRBC BLD AUTO-RTO: 0 /100 WBC (ref 0–0.2)
PH UR STRIP.AUTO: 5.5 [PH] (ref 5–8)
PLATELET # BLD AUTO: 316 10*3/MM3 (ref 140–450)
PMV BLD AUTO: 10 FL (ref 6–12)
POTASSIUM SERPL-SCNC: 4.5 MMOL/L (ref 3.5–5.2)
PROT SERPL-MCNC: 6.8 G/DL (ref 6–8.5)
PROT UR QL STRIP: NEGATIVE
RBC # BLD AUTO: 4.76 10*6/MM3 (ref 3.77–5.28)
SODIUM SERPL-SCNC: 138 MMOL/L (ref 136–145)
SP GR UR STRIP: 1.02 (ref 1–1.03)
TRIGL SERPL-MCNC: 84 MG/DL (ref 0–150)
TSH SERPL DL<=0.05 MIU/L-ACNC: 2.94 UIU/ML (ref 0.27–4.2)
UROBILINOGEN UR QL STRIP: NORMAL
VLDLC SERPL-MCNC: 14 MG/DL (ref 5–40)
WBC NRBC COR # BLD: 5.5 10*3/MM3 (ref 3.4–10.8)

## 2023-02-24 PROCEDURE — 82570 ASSAY OF URINE CREATININE: CPT | Performed by: NURSE PRACTITIONER

## 2023-02-24 PROCEDURE — 99214 OFFICE O/P EST MOD 30 MIN: CPT | Performed by: NURSE PRACTITIONER

## 2023-02-24 PROCEDURE — 82043 UR ALBUMIN QUANTITATIVE: CPT | Performed by: NURSE PRACTITIONER

## 2023-02-24 PROCEDURE — 83036 HEMOGLOBIN GLYCOSYLATED A1C: CPT | Performed by: NURSE PRACTITIONER

## 2023-02-24 PROCEDURE — 86803 HEPATITIS C AB TEST: CPT | Performed by: NURSE PRACTITIONER

## 2023-02-24 PROCEDURE — 80061 LIPID PANEL: CPT | Performed by: NURSE PRACTITIONER

## 2023-02-24 PROCEDURE — 80050 GENERAL HEALTH PANEL: CPT | Performed by: NURSE PRACTITIONER

## 2023-02-24 PROCEDURE — 81003 URINALYSIS AUTO W/O SCOPE: CPT | Performed by: NURSE PRACTITIONER

## 2023-02-24 RX ORDER — FLUTICASONE PROPIONATE 50 MCG
2 SPRAY, SUSPENSION (ML) NASAL DAILY
Qty: 16 G | Refills: 5 | Status: SHIPPED | OUTPATIENT
Start: 2023-02-24

## 2023-02-24 RX ORDER — CETIRIZINE HYDROCHLORIDE 10 MG/1
10 TABLET ORAL
Qty: 90 TABLET | Refills: 1 | Status: SHIPPED | OUTPATIENT
Start: 2023-02-24

## 2023-02-24 RX ORDER — DOCUSATE SODIUM 100 MG/1
100 CAPSULE, LIQUID FILLED ORAL 2 TIMES DAILY PRN
Qty: 180 CAPSULE | Refills: 1 | Status: SHIPPED | OUTPATIENT
Start: 2023-02-24

## 2023-02-24 RX ORDER — SEMAGLUTIDE 0.5 MG/.5ML
0.5 INJECTION, SOLUTION SUBCUTANEOUS WEEKLY
Qty: 2 ML | Refills: 0 | Status: SHIPPED | OUTPATIENT
Start: 2023-02-24

## 2023-02-24 RX ORDER — VENLAFAXINE HYDROCHLORIDE 75 MG/1
75 CAPSULE, EXTENDED RELEASE ORAL DAILY
Qty: 90 CAPSULE | Refills: 1 | Status: SHIPPED | OUTPATIENT
Start: 2023-02-24

## 2023-02-24 NOTE — PROGRESS NOTES
Follow Up Office Visit      Patient Name: Luis Masterson  : 1965   MRN: [id number]     Chief Complaint:    Chief Complaint   Patient presents with   • Allergies   • Hyperlipidemia   • Hypertension   • impaired fasting glucose   • Anxiety   • Chronic Kidney Disease       History of Present Illness: Luis Masterson is a 57 y.o. female who is here today to follow up for HTN, hyperlipidemia, IFG, anxiety, CKD, allergies, hot flashes and night sweats     mammogram-10/2021   dexa-2018  Pap-10/2021  Colonoscopy- did not schedule due to knee surgery     Stopped saxenda due to surgery weight gain over 20 pounds states she has changed her diet and exercise now she is back to work and has lost back down to 173 on her own would like to go back on Saxenda or Wegovy  After surgery she was unable to exercise, off work for 6 weeks       Subjective      Review of Systems:   Review of Systems   Constitutional: Negative for fever.   HENT: Negative for ear pain and sore throat.    Respiratory: Negative for cough.    Cardiovascular: Negative for chest pain.   Gastrointestinal: Negative for abdominal pain, diarrhea, nausea and vomiting.   Genitourinary: Negative for dysuria.   Musculoskeletal: Negative for myalgias.   Psychiatric/Behavioral: The patient is not nervous/anxious.         Past Medical History:   Past Medical History:   Diagnosis Date   • Kidney stones    • Night sweats    • PONV (postoperative nausea and vomiting)     scope works well per pt.   • Vaginal cancer (HCC)        Past Surgical History:   Past Surgical History:   Procedure Laterality Date   • CARPAL TUNNEL RELEASE     • CHOLECYSTECTOMY     • ENDOMETRIAL ABLATION     • HYSTERECTOMY     • KIDNEY STONE SURGERY     • KNEE ARTHROSCOPY W/ MENISCECTOMY Left 2022    Procedure: KNEE ARTHROSCOPY WITH PARTIAL MEDIAL MENISCECTOMY, POSSIBLE CHONDROPLASTY;  Surgeon: Lloyd Robles MD;  [facility]: [facility]  Service: Orthopedics;  Laterality: Left;   •  KNEE SURGERY  22   • THORACIC OUTLET SURGERY         Family History:   Family History   Problem Relation Age of Onset   • Breast cancer Other    • Stroke Other    • Heart disease Other    • Diabetes Other    • Malig Hyperthermia Neg Hx        Social History:   Social History     Socioeconomic History   • Marital status:    Tobacco Use   • Smoking status: Former     Packs/day: 0.50     Years: 15.00     Pack years: 7.50     Types: Cigarettes     Quit date: 2011     Years since quittin.1   • Smokeless tobacco: Never   Vaping Use   • Vaping Use: Never used   Substance and Sexual Activity   • Alcohol use: Never   • Drug use: Never   • Sexual activity: Defer       Medications:     Current Outpatient Medications:   •  cetirizine (zyrTEC) 10 MG tablet, Take 1 tablet by mouth every night at bedtime., Disp: 90 tablet, Rfl: 1  •  docusate sodium (Colace) 100 MG capsule, Take 1 capsule by mouth 2 (Two) Times a Day As Needed for Constipation., Disp: 180 capsule, Rfl: 1  •  fluticasone (FLONASE) 50 MCG/ACT nasal spray, 2 sprays into the nostril(s) as directed by provider Daily., Disp: 16 g, Rfl: 5  •  melatonin 5 MG tablet tablet, 5 mg., Disp: , Rfl:   •  ondansetron (ZOFRAN) 4 MG tablet, 4 mg., Disp: , Rfl:   •  valACYclovir (Valtrex) 1000 MG tablet, Take 1 tablet by mouth 2 (Two) Times a Day. (Patient taking differently: Take 1,000 mg by mouth As Needed.), Disp: 60 tablet, Rfl: 2  •  venlafaxine XR (Effexor XR) 75 MG 24 hr capsule, Take 1 capsule by mouth Daily., Disp: 90 capsule, Rfl: 1  •  Semaglutide-Weight Management (Wegovy) 0.5 MG/0.5ML solution auto-injector, Inject 0.5 mL under the skin into the appropriate area as directed 1 (One) Time Per Week., Disp: 2 mL, Rfl: 0    Allergies:   Allergies   Allergen Reactions   • Sulfa Antibiotics Swelling     Facial and eye swelling            Objective     Physical Exam:  Vital Signs:   Vitals:    23 0754   BP: 142/74   Pulse: 80   Temp: 97.5 °F (36.4  "°C)   SpO2: 99%   Weight: 78.5 kg (173 lb)   Height: 154.9 cm (61\")     Body mass index is 32.69 kg/m².     Physical Exam  HENT:      Right Ear: Tympanic membrane normal.      Left Ear: Tympanic membrane normal.      Nose: Nose normal.      Mouth/Throat:      Mouth: Mucous membranes are moist.   Eyes:      Conjunctiva/sclera: Conjunctivae normal.   Neck:      Vascular: No carotid bruit.   Cardiovascular:      Rate and Rhythm: Normal rate and regular rhythm.      Heart sounds: Normal heart sounds.   Pulmonary:      Effort: Pulmonary effort is normal.      Breath sounds: Normal breath sounds.   Abdominal:      General: Bowel sounds are normal.      Palpations: Abdomen is soft.   Musculoskeletal:      Right lower leg: No edema.      Left lower leg: No edema.   Skin:     General: Skin is warm and dry.   Neurological:      Mental Status: She is alert.   Psychiatric:         Mood and Affect: Mood normal.         Behavior: Behavior normal.             Assessment / Plan      Assessment/Plan:   Diagnoses and all orders for this visit:    1. Mixed hyperlipidemia (Primary)  -     Lipid Panel    2. Impaired fasting glucose  -     Comprehensive Metabolic Panel  -     Microalbumin / Creatinine Urine Ratio - Urine, Clean Catch  -     Hemoglobin A1c  -     Urinalysis With Culture If Indicated -    3. Anxiety    4. Hot flashes    5. Night sweats    6. Chronic kidney disease, unspecified CKD stage    7. Elevated liver enzymes    8. Seasonal allergies  -     CBC Auto Differential    9. Class 1 obesity due to excess calories without serious comorbidity with body mass index (BMI) of 32.0 to 32.9 in adult    10. Screening mammogram for breast cancer  -     Mammo Screening Digital Tomosynthesis Bilateral With CAD; Future    11. Screening for malignant neoplasm of colon  -     Ambulatory Referral For Screening Colonoscopy    12. Screening for thyroid disorder  -     TSH    13. Need for hepatitis C screening test  -     Hepatitis C " Antibody    Other orders  -     cetirizine (zyrTEC) 10 MG tablet; Take 1 tablet by mouth every night at bedtime.  Dispense: 90 tablet; Refill: 1  -     docusate sodium (Colace) 100 MG capsule; Take 1 capsule by mouth 2 (Two) Times a Day As Needed for Constipation.  Dispense: 180 capsule; Refill: 1  -     venlafaxine XR (Effexor XR) 75 MG 24 hr capsule; Take 1 capsule by mouth Daily.  Dispense: 90 capsule; Refill: 1  -     fluticasone (FLONASE) 50 MCG/ACT nasal spray; 2 sprays into the nostril(s) as directed by provider Daily.  Dispense: 16 g; Refill: 5  -     Semaglutide-Weight Management (Wegovy) 0.5 MG/0.5ML solution auto-injector; Inject 0.5 mL under the skin into the appropriate area as directed 1 (One) Time Per Week.  Dispense: 2 mL; Refill: 0       Hyperlipidemia we will obtain lipid panel to monitor record reduce fried foods red meat pork products cheese increase fruits vegetables whole grains  Impaired fasting glucose will obtain hemoglobin A1c to monitor recommend his carb intake exercise 30 minutes daily and weight loss  Anxiety hot flashes night sweats currently controlled with Effexor 75 mg daily provide refills  Elevated liver enzymes will obtain CMP to monitor if remains elevated will obtain liver ultrasound  Seasonal allergies currently controlled with Zyrtec Flonase will provide refills  Chronic kidney disease will obtain CMP to monitor recommend avoid all NSAIDs increase water intake  Elevated blood pressure reading manual recheck still remains slightly elevated recommend decrease salt intake increase water intake weight loss blood pressure check in 2 weeks if remains elevated will discuss starting lisinopril-hydrochlorothiazide  Class I obesity with comorbidity of hyperlipidemia impaired fasting glucose we will start Wegovy recommend reduce overall caloric intake increase water intake and exercise 30 minutes daily we will provide order for screening mammogram denies lumps mass tenderness blood or  "discharge from nipple  We will provide order for screening colonoscopy denies blood in stool recent change in bowel habits or family history of colon cancer      Follow Up:   Return in about 6 months (around 8/24/2023).    Melinda Torres, APRN    \"Please note that portions of this note were completed with a voice recognition program.\"    "

## 2023-02-27 ENCOUNTER — OFFICE VISIT (OUTPATIENT)
Dept: ORTHOPEDIC SURGERY | Facility: CLINIC | Age: 58
End: 2023-02-27
Payer: COMMERCIAL

## 2023-02-27 VITALS — WEIGHT: 169 LBS | HEIGHT: 61 IN | BODY MASS INDEX: 31.91 KG/M2

## 2023-02-27 DIAGNOSIS — Z47.89 AFTERCARE FOLLOWING SURGERY OF THE MUSCULOSKELETAL SYSTEM: Primary | ICD-10-CM

## 2023-02-27 PROCEDURE — 20610 DRAIN/INJ JOINT/BURSA W/O US: CPT | Performed by: PHYSICIAN ASSISTANT

## 2023-02-27 RX ORDER — HYALURONATE SODIUM 10 MG/ML
20 SYRINGE (ML) INTRAARTICULAR
Status: COMPLETED | OUTPATIENT
Start: 2023-02-27 | End: 2023-02-27

## 2023-02-27 RX ADMIN — Medication 20 MG: at 15:34

## 2023-02-27 NOTE — PROGRESS NOTES
"Chief Complaint  Pain and Follow-up of the Left Knee    Subjective      Luis Masterson presents to [facility] for follow-up of left knee arthroscopy with partial medial meniscectomy and medial femoral chondroplasty performed on 9/22/2022 by Dr. Sosa.  She has had ongoing left knee pain and last received a left knee steroid injection in office on 1/16/2023, which she reports provided significant relief.  She reports that she is doing \"much better\".  She does presents today with insurance authorization for left knee viscosupplementation and is requesting left knee Euflexxa injection #1 in office today.    Objective   Allergies   Allergen Reactions   • Sulfa Antibiotics Swelling     Facial and eye swelling        Vital Signs:   Ht 154.9 cm (61\")   Wt 76.7 kg (169 lb)   BMI 31.93 kg/m²       Physical Exam    Constitutional: Awake, alert. Well nourished appearance.    Integumentary: Warm, dry, intact. No obvious rashes.    HENT: Atraumatic, normocephalic.   Respiratory: Non labored respirations .   Cardiovascular: Intact peripheral pulses.    Psychiatric: Normal mood and affect. A&O X3    Ortho Exam  Knee: Well-healed surgical scars noted.  Skin is warm, dry, and intact.  Tenderness to palpation of medial joint line.  Full knee extension and flexion to 125 degrees.  Full plantarflexion and dorsiflexion of the ankle.  Sensation intact to light touch.  Distal neurovascular intact.  Patient is fully weightbearing with nonantalgic gait.  Smooth sit to stand transition.    Imaging Results (Most Recent)     None           Large Joint Arthrocentesis: L knee  Date/Time: 2/27/2023 3:34 PM  Consent given by: patient  Site marked: site marked  Timeout: Immediately prior to procedure a time out was called to verify the correct patient, procedure, equipment, support staff and site/side marked as required   Supporting Documentation  Indications: pain   Procedure Details  Location: knee - L knee  Needle " gauge: 21G.  Medications administered: 20 mg Sodium Hyaluronate 20 MG/2ML  Patient tolerance: patient tolerated the procedure well with no immediate complications              Assessment and Plan   Problem List Items Addressed This Visit    None  Visit Diagnoses     Aftercare following L knee arthroscopy with PMM, MFC    -  Primary        Follow Up   Return in about 1 week (around 3/6/2023).  Patient is a former smoker.  Encouraged continued tobacco cessation options for smoking cessation.    Patient Instructions   Left knee Euflexxa injection #1 administered in office today.  Follow-up in office in 1 week for next injection. Call with changes or concerns.      Patient was given instructions and counseling regarding her condition or for health maintenance advice. Please see specific information pulled into the AVS if appropriate.

## 2023-02-27 NOTE — PATIENT INSTRUCTIONS
Left knee Euflexxa injection #1 administered in office today.  Follow-up in office in 1 week for next injection. Call with changes or concerns.

## 2023-03-01 ENCOUNTER — TELEPHONE (OUTPATIENT)
Dept: ORTHOPEDIC SURGERY | Facility: CLINIC | Age: 58
End: 2023-03-01
Payer: COMMERCIAL

## 2023-03-01 NOTE — TELEPHONE ENCOUNTER
Caller: DAVID ALCOCER    Relationship to patient: SELF    Best call back number: [phone]    Chief complaint: INJECTION    Type of visit: INJECTION    Requested date: NA    If rescheduling, when is the original appointment: 03/06/2023 AND 03/13/2023    Additional notes: PATIENT DOES NOT WANT INJECTIONS

## 2023-03-01 NOTE — TELEPHONE ENCOUNTER
PT DOES NOT WANT TO CONTINUE SERIES AS OF 03/01/23 BUT DOES WANT TO KEEP 03/06/23 APPT. - I CANCELLED HER EUFLEXXA #3 APPT.

## 2023-03-06 NOTE — TELEPHONE ENCOUNTER
PATIENT CALLED BACK AND CANCELLED  APPT. FOR TODAY, 3/6/23 FOR EUFLEXXA INJECTION. PATIENT SAYS SHE DID NOT WISH TO RESCHEDULE. SAYS INJECTION WAS HORRIBLE AND SHE COULDN'T EVEN WALK AFTER 1ST INJECTION.

## 2023-03-09 ENCOUNTER — HOSPITAL ENCOUNTER (OUTPATIENT)
Dept: MAMMOGRAPHY | Facility: HOSPITAL | Age: 58
Discharge: HOME | End: 2023-03-09
Admitting: NURSE PRACTITIONER
Payer: COMMERCIAL

## 2023-03-09 DIAGNOSIS — Z12.31 SCREENING MAMMOGRAM FOR BREAST CANCER: ICD-10-CM

## 2023-03-09 PROCEDURE — 77063 BREAST TOMOSYNTHESIS BI: CPT

## 2023-03-09 PROCEDURE — 77067 SCR MAMMO BI INCL CAD: CPT

## 2023-04-26 RX ORDER — SEMAGLUTIDE 0.5 MG/.5ML
0.5 INJECTION, SOLUTION SUBCUTANEOUS WEEKLY
Qty: 2 ML | Refills: 0 | Status: CANCELLED | OUTPATIENT
Start: 2023-04-26

## 2023-04-26 RX ORDER — SEMAGLUTIDE 1 MG/.5ML
1 INJECTION, SOLUTION SUBCUTANEOUS WEEKLY
Qty: 2 ML | Refills: 0 | Status: SHIPPED | OUTPATIENT
Start: 2023-04-26

## 2023-05-31 RX ORDER — SEMAGLUTIDE 1 MG/.5ML
1 INJECTION, SOLUTION SUBCUTANEOUS WEEKLY
Qty: 2 ML | Refills: 0 | Status: CANCELLED | OUTPATIENT
Start: 2023-05-31

## 2023-08-24 ENCOUNTER — OFFICE VISIT (OUTPATIENT)
Dept: FAMILY MEDICINE CLINIC | Facility: CLINIC | Age: 58
End: 2023-08-24
Payer: COMMERCIAL

## 2023-08-24 VITALS
HEART RATE: 85 BPM | SYSTOLIC BLOOD PRESSURE: 134 MMHG | WEIGHT: 173 LBS | HEIGHT: 61 IN | DIASTOLIC BLOOD PRESSURE: 84 MMHG | OXYGEN SATURATION: 98 % | BODY MASS INDEX: 32.66 KG/M2 | TEMPERATURE: 98.7 F

## 2023-08-24 DIAGNOSIS — E78.2 MIXED HYPERLIPIDEMIA: Primary | ICD-10-CM

## 2023-08-24 DIAGNOSIS — Z13.29 SCREENING FOR THYROID DISORDER: ICD-10-CM

## 2023-08-24 DIAGNOSIS — J30.2 SEASONAL ALLERGIES: ICD-10-CM

## 2023-08-24 DIAGNOSIS — K76.0 FATTY LIVER: ICD-10-CM

## 2023-08-24 DIAGNOSIS — R74.8 ELEVATED LIVER ENZYMES: ICD-10-CM

## 2023-08-24 DIAGNOSIS — K59.00 CONSTIPATION, UNSPECIFIED CONSTIPATION TYPE: ICD-10-CM

## 2023-08-24 DIAGNOSIS — R61 NIGHT SWEATS: ICD-10-CM

## 2023-08-24 DIAGNOSIS — R23.2 HOT FLASHES: ICD-10-CM

## 2023-08-24 DIAGNOSIS — F32.0 CURRENT MILD EPISODE OF MAJOR DEPRESSIVE DISORDER WITHOUT PRIOR EPISODE: ICD-10-CM

## 2023-08-24 DIAGNOSIS — Z12.11 SCREENING FOR MALIGNANT NEOPLASM OF COLON: ICD-10-CM

## 2023-08-24 DIAGNOSIS — R73.01 IMPAIRED FASTING GLUCOSE: ICD-10-CM

## 2023-08-24 DIAGNOSIS — F41.9 ANXIETY: ICD-10-CM

## 2023-08-24 RX ORDER — PEN NEEDLE, DIABETIC 30 GX5/16"
1 NEEDLE, DISPOSABLE MISCELLANEOUS NIGHTLY
Qty: 100 EACH | Refills: 2 | Status: SHIPPED | OUTPATIENT
Start: 2023-08-24

## 2023-08-24 RX ORDER — VENLAFAXINE HYDROCHLORIDE 150 MG/1
150 CAPSULE, EXTENDED RELEASE ORAL DAILY
Qty: 90 CAPSULE | Refills: 1 | Status: SHIPPED | OUTPATIENT
Start: 2023-08-24

## 2023-08-24 NOTE — PROGRESS NOTES
Follow Up Office Visit      Patient Name: Luis Masterson  : 1965   MRN: [id number]     Chief Complaint:    Chief Complaint   Patient presents with    Hyperlipidemia    impaired fasting glucose    Anxiety    Allergies       History of Present Illness: Luis Masterson is a 58 y.o. female who is here today to follow up for IFG, hyperlipidemia, anxiety, allergies.    mammogram- 3/2023  dexa-2018 normal   Pap-10/2021  Labs- 2023  Colonoscopy none    C/o  She is having a recent issue with an increase in depression over the last 4 months. She has lost 3 people close to her in that time frame. Her father was one of them and her best friend  Tried wellbutrin in past caused nightmares     States the wegovy caused nausea abdominal fatigue, states mad her feel just bad  Would like to try saxenda, tried in past with food results     Subjective      Review of Systems:   Review of Systems   Constitutional:  Negative for fever.   HENT:  Negative for ear pain and sore throat.    Respiratory:  Negative for cough.    Cardiovascular:  Negative for chest pain.   Gastrointestinal:  Negative for abdominal pain, diarrhea, nausea and vomiting.   Genitourinary:  Negative for dysuria.   Musculoskeletal:  Negative for myalgias.   Psychiatric/Behavioral:          Depression      Past Medical History:   Past Medical History:   Diagnosis Date    CTS (carpal tunnel syndrome)     Kidney stones     Night sweats     PONV (postoperative nausea and vomiting)     scope works well per pt.    Tear of meniscus of knee     Vaginal cancer        Past Surgical History:   Past Surgical History:   Procedure Laterality Date    CARPAL TUNNEL RELEASE      CHOLECYSTECTOMY      ENDOMETRIAL ABLATION      HAND SURGERY      HYSTERECTOMY      KIDNEY STONE SURGERY      KNEE ARTHROSCOPY W/ MENISCECTOMY Left 2022    Procedure: KNEE ARTHROSCOPY WITH PARTIAL MEDIAL MENISCECTOMY, POSSIBLE CHONDROPLASTY;  Surgeon: Lloyd Robles MD;  Location: [facility]  [facility];  Service: Orthopedics;  Laterality: Left;    KNEE SURGERY  22    NECK SURGERY      THORACIC OUTLET SURGERY         Family History:   Family History   Problem Relation Age of Onset    Breast cancer Other     Stroke Other     Heart disease Other     Diabetes Other     Cancer Mother     Diabetes Mother     Osteoporosis Mother     Malig Hyperthermia Neg Hx        Social History:   Social History     Socioeconomic History    Marital status:    Tobacco Use    Smoking status: Former     Packs/day: 0.50     Years: 15.00     Pack years: 7.50     Types: Cigarettes     Quit date: 2011     Years since quittin.6    Smokeless tobacco: Never   Vaping Use    Vaping Use: Never used   Substance and Sexual Activity    Alcohol use: Never    Drug use: Never    Sexual activity: Defer       Medications:     Current Outpatient Medications:     cetirizine (zyrTEC) 10 MG tablet, Take 1 tablet by mouth every night at bedtime., Disp: 90 tablet, Rfl: 1    docusate sodium (Colace) 100 MG capsule, Take 1 capsule by mouth 2 (Two) Times a Day As Needed for Constipation., Disp: 180 capsule, Rfl: 1    fluticasone (FLONASE) 50 MCG/ACT nasal spray, 2 sprays into the nostril(s) as directed by provider Daily., Disp: 16 g, Rfl: 5    melatonin 5 MG tablet tablet, 1 tablet., Disp: , Rfl:     ondansetron (ZOFRAN) 4 MG tablet, 1 tablet., Disp: , Rfl:     valACYclovir (Valtrex) 1000 MG tablet, Take 1 tablet by mouth 2 (Two) Times a Day. (Patient taking differently: Take 1 tablet by mouth As Needed.), Disp: 60 tablet, Rfl: 2    venlafaxine XR (Effexor XR) 150 MG 24 hr capsule, Take 1 capsule by mouth Daily., Disp: 90 capsule, Rfl: 1    Insulin Pen Needle (Pen Needles 3/16\") 31G X 5 MM misc, 1 each Every Night., Disp: 100 each, Rfl: 2    Liraglutide (SAXENDA) 18 MG/3ML injection pen, Inject 0.6mg under skin daily for week one, THEN 1.2mg daily for week two, THEN 1.8mg daily for week three, then 2.4mg daily for week four., Disp: 3 mL, " "Rfl: 0    Allergies:   Allergies   Allergen Reactions    Sulfa Antibiotics Swelling     Facial and eye swelling            PHQ-2 Total Score: 1   PHQ-9 Total Score: 1     Objective     Physical Exam:  Vital Signs:   Vitals:    08/24/23 0745   BP: 134/84   Pulse: 85   Temp: 98.7 øF (37.1 øC)   SpO2: 98%   Weight: 78.5 kg (173 lb)   Height: 154.9 cm (61\")     Body mass index is 32.69 kg/mý.   BMI is >= 30 and <35. (Class 1 Obesity). The following options were offered after discussion;: exercise counseling/recommendations and nutrition counseling/recommendations       Physical Exam  HENT:      Right Ear: Tympanic membrane normal.      Left Ear: Tympanic membrane normal.      Nose: Nose normal.      Mouth/Throat:      Mouth: Mucous membranes are moist.   Eyes:      Conjunctiva/sclera: Conjunctivae normal.   Cardiovascular:      Rate and Rhythm: Normal rate and regular rhythm.      Heart sounds: Normal heart sounds. No murmur heard.  Pulmonary:      Effort: Pulmonary effort is normal.      Breath sounds: Normal breath sounds.   Abdominal:      General: Bowel sounds are normal.      Palpations: Abdomen is soft.   Musculoskeletal:      Right lower leg: No edema.      Left lower leg: No edema.   Skin:     General: Skin is warm and dry.   Neurological:      Mental Status: She is alert.   Psychiatric:      Comments: Crying, sad           Assessment / Plan      Assessment/Plan:   Diagnoses and all orders for this visit:    1. Mixed hyperlipidemia (Primary)  -     Lipid Panel    2. Impaired fasting glucose  -     Comprehensive Metabolic Panel  -     Hemoglobin A1c  -     Urinalysis With Culture If Indicated -    3. Elevated liver enzymes  -     Comprehensive Metabolic Panel    4. Constipation, unspecified constipation type    5. Fatty liver  -     Comprehensive Metabolic Panel    6. Seasonal allergies    7. Anxiety    8. Night sweats    9. Hot flashes    10. Screening for thyroid disorder  -     TSH    11. Screening for " "malignant neoplasm of colon  -     Cologuard - Stool, Per Rectum; Future    12. Current mild episode of major depressive disorder without prior episode    Other orders  -     Liraglutide (SAXENDA) 18 MG/3ML injection pen; Inject 0.6mg under skin daily for week one, THEN 1.2mg daily for week two, THEN 1.8mg daily for week three, then 2.4mg daily for week four.  Dispense: 3 mL; Refill: 0  -     Insulin Pen Needle (Pen Needles 3/16\") 31G X 5 MM misc; 1 each Every Night.  Dispense: 100 each; Refill: 2  -     venlafaxine XR (Effexor XR) 150 MG 24 hr capsule; Take 1 capsule by mouth Daily.  Dispense: 90 capsule; Refill: 1         Hyperlipidemia obtain lipid  recommend reduce fried foods red meat pork products cheese increase fruits fruits whole grains exercise 30 minutes daily weight loss  Impaired fasting glucose obtain hemoglobin A1c monitor Reca reduce added sugars and carbs increase exercise 30 minutes daily  Elevated liver enzymes we will monitor with a CMP on last check liver enzymes were normal liver ultrasound showed fatty liver recommend weight loss  Seasonal allergies currently controlled with Flonase and Zyrtec  Anxiety night sweats hot flashes currently controlled with Effexor  Depression uncontrolled we will increase Effexor 150 mg daily do recommend counseling      Follow Up:   Return in about 6 months (around 2/24/2024).    RODGER Gill    \"Please note that portions of this note were completed with a voice recognition program.\"    "

## 2024-02-13 ENCOUNTER — CLINICAL SUPPORT (OUTPATIENT)
Dept: FAMILY MEDICINE CLINIC | Facility: CLINIC | Age: 59
End: 2024-02-13
Payer: COMMERCIAL

## 2024-02-13 DIAGNOSIS — E66.09 CLASS 1 OBESITY DUE TO EXCESS CALORIES WITHOUT SERIOUS COMORBIDITY WITH BODY MASS INDEX (BMI) OF 32.0 TO 32.9 IN ADULT: ICD-10-CM

## 2024-02-13 DIAGNOSIS — K76.0 FATTY LIVER: ICD-10-CM

## 2024-02-13 DIAGNOSIS — N18.9 CHRONIC KIDNEY DISEASE, UNSPECIFIED CKD STAGE: ICD-10-CM

## 2024-02-13 DIAGNOSIS — R73.01 IMPAIRED FASTING GLUCOSE: ICD-10-CM

## 2024-02-13 DIAGNOSIS — E78.2 MIXED HYPERLIPIDEMIA: ICD-10-CM

## 2024-02-13 DIAGNOSIS — R74.8 ELEVATED LIVER ENZYMES: Primary | ICD-10-CM

## 2024-02-13 LAB
ALBUMIN SERPL-MCNC: 4.7 G/DL (ref 3.5–5.2)
ALBUMIN UR-MCNC: <1.2 MG/DL
ALBUMIN/GLOB SERPL: 1.7 G/DL
ALP SERPL-CCNC: 62 U/L (ref 39–117)
ALT SERPL W P-5'-P-CCNC: 70 U/L (ref 1–33)
ANION GAP SERPL CALCULATED.3IONS-SCNC: 13.1 MMOL/L (ref 5–15)
AST SERPL-CCNC: 49 U/L (ref 1–32)
BASOPHILS # BLD AUTO: 0.05 10*3/MM3 (ref 0–0.2)
BASOPHILS NFR BLD AUTO: 0.9 % (ref 0–1.5)
BILIRUB SERPL-MCNC: 0.8 MG/DL (ref 0–1.2)
BILIRUB UR QL STRIP: NEGATIVE
BUN SERPL-MCNC: 13 MG/DL (ref 6–20)
BUN/CREAT SERPL: 10.5 (ref 7–25)
CALCIUM SPEC-SCNC: 9.7 MG/DL (ref 8.6–10.5)
CHLORIDE SERPL-SCNC: 103 MMOL/L (ref 98–107)
CHOLEST SERPL-MCNC: 239 MG/DL (ref 0–200)
CLARITY UR: CLEAR
CO2 SERPL-SCNC: 23.9 MMOL/L (ref 22–29)
COLOR UR: YELLOW
CREAT SERPL-MCNC: 1.24 MG/DL (ref 0.57–1)
CREAT UR-MCNC: 182.2 MG/DL
DEPRECATED RDW RBC AUTO: 39.4 FL (ref 37–54)
EGFRCR SERPLBLD CKD-EPI 2021: 51.2 ML/MIN/1.73
EOSINOPHIL # BLD AUTO: 0.13 10*3/MM3 (ref 0–0.4)
EOSINOPHIL NFR BLD AUTO: 2.3 % (ref 0.3–6.2)
ERYTHROCYTE [DISTWIDTH] IN BLOOD BY AUTOMATED COUNT: 11.8 % (ref 12.3–15.4)
GLOBULIN UR ELPH-MCNC: 2.7 GM/DL
GLUCOSE SERPL-MCNC: 111 MG/DL (ref 65–99)
GLUCOSE UR STRIP-MCNC: NEGATIVE MG/DL
HBA1C MFR BLD: 5.8 % (ref 4.8–5.6)
HCT VFR BLD AUTO: 44.2 % (ref 34–46.6)
HDLC SERPL-MCNC: 57 MG/DL (ref 40–60)
HGB BLD-MCNC: 14.5 G/DL (ref 12–15.9)
HGB UR QL STRIP.AUTO: NEGATIVE
HOLD SPECIMEN: NORMAL
IMM GRANULOCYTES # BLD AUTO: 0.01 10*3/MM3 (ref 0–0.05)
IMM GRANULOCYTES NFR BLD AUTO: 0.2 % (ref 0–0.5)
KETONES UR QL STRIP: NEGATIVE
LDLC SERPL CALC-MCNC: 162 MG/DL (ref 0–100)
LDLC/HDLC SERPL: 2.79 {RATIO}
LEUKOCYTE ESTERASE UR QL STRIP.AUTO: NEGATIVE
LYMPHOCYTES # BLD AUTO: 1.63 10*3/MM3 (ref 0.7–3.1)
LYMPHOCYTES NFR BLD AUTO: 29.1 % (ref 19.6–45.3)
MCH RBC QN AUTO: 29.9 PG (ref 26.6–33)
MCHC RBC AUTO-ENTMCNC: 32.8 G/DL (ref 31.5–35.7)
MCV RBC AUTO: 91.1 FL (ref 79–97)
MICROALBUMIN/CREAT UR: NORMAL MG/G{CREAT}
MONOCYTES # BLD AUTO: 0.41 10*3/MM3 (ref 0.1–0.9)
MONOCYTES NFR BLD AUTO: 7.3 % (ref 5–12)
NEUTROPHILS NFR BLD AUTO: 3.38 10*3/MM3 (ref 1.7–7)
NEUTROPHILS NFR BLD AUTO: 60.2 % (ref 42.7–76)
NITRITE UR QL STRIP: NEGATIVE
NRBC BLD AUTO-RTO: 0 /100 WBC (ref 0–0.2)
PH UR STRIP.AUTO: 5.5 [PH] (ref 5–8)
PLATELET # BLD AUTO: 259 10*3/MM3 (ref 140–450)
PMV BLD AUTO: 10.8 FL (ref 6–12)
POTASSIUM SERPL-SCNC: 3.9 MMOL/L (ref 3.5–5.2)
PROT SERPL-MCNC: 7.4 G/DL (ref 6–8.5)
PROT UR QL STRIP: NEGATIVE
RBC # BLD AUTO: 4.85 10*6/MM3 (ref 3.77–5.28)
SODIUM SERPL-SCNC: 140 MMOL/L (ref 136–145)
SP GR UR STRIP: 1.02 (ref 1–1.03)
TRIGL SERPL-MCNC: 114 MG/DL (ref 0–150)
TSH SERPL DL<=0.05 MIU/L-ACNC: 2.9 UIU/ML (ref 0.27–4.2)
UROBILINOGEN UR QL STRIP: NORMAL
VLDLC SERPL-MCNC: 20 MG/DL (ref 5–40)
WBC NRBC COR # BLD AUTO: 5.61 10*3/MM3 (ref 3.4–10.8)

## 2024-02-13 PROCEDURE — 80061 LIPID PANEL: CPT | Performed by: NURSE PRACTITIONER

## 2024-02-13 PROCEDURE — 80050 GENERAL HEALTH PANEL: CPT | Performed by: NURSE PRACTITIONER

## 2024-02-13 PROCEDURE — 82043 UR ALBUMIN QUANTITATIVE: CPT | Performed by: NURSE PRACTITIONER

## 2024-02-13 PROCEDURE — 83036 HEMOGLOBIN GLYCOSYLATED A1C: CPT | Performed by: NURSE PRACTITIONER

## 2024-02-13 PROCEDURE — 82570 ASSAY OF URINE CREATININE: CPT | Performed by: NURSE PRACTITIONER

## 2024-02-13 PROCEDURE — 81003 URINALYSIS AUTO W/O SCOPE: CPT | Performed by: NURSE PRACTITIONER

## 2024-02-28 ENCOUNTER — OFFICE VISIT (OUTPATIENT)
Dept: FAMILY MEDICINE CLINIC | Facility: CLINIC | Age: 59
End: 2024-02-28
Payer: COMMERCIAL

## 2024-02-28 VITALS
HEART RATE: 68 BPM | DIASTOLIC BLOOD PRESSURE: 83 MMHG | TEMPERATURE: 97.2 F | SYSTOLIC BLOOD PRESSURE: 129 MMHG | BODY MASS INDEX: 33.61 KG/M2 | HEIGHT: 61 IN | OXYGEN SATURATION: 98 % | WEIGHT: 178 LBS

## 2024-02-28 DIAGNOSIS — R74.8 ELEVATED LIVER ENZYMES: ICD-10-CM

## 2024-02-28 DIAGNOSIS — E66.09 CLASS 1 OBESITY DUE TO EXCESS CALORIES WITHOUT SERIOUS COMORBIDITY WITH BODY MASS INDEX (BMI) OF 32.0 TO 32.9 IN ADULT: ICD-10-CM

## 2024-02-28 DIAGNOSIS — F41.9 ANXIETY: ICD-10-CM

## 2024-02-28 DIAGNOSIS — K59.00 CONSTIPATION, UNSPECIFIED CONSTIPATION TYPE: ICD-10-CM

## 2024-02-28 DIAGNOSIS — F32.0 CURRENT MILD EPISODE OF MAJOR DEPRESSIVE DISORDER WITHOUT PRIOR EPISODE: ICD-10-CM

## 2024-02-28 DIAGNOSIS — K76.0 FATTY LIVER: ICD-10-CM

## 2024-02-28 DIAGNOSIS — E78.2 MIXED HYPERLIPIDEMIA: Primary | ICD-10-CM

## 2024-02-28 DIAGNOSIS — N28.9 RENAL INSUFFICIENCY: ICD-10-CM

## 2024-02-28 DIAGNOSIS — R73.01 IMPAIRED FASTING GLUCOSE: ICD-10-CM

## 2024-02-28 RX ORDER — DOCUSATE SODIUM 100 MG/1
100 CAPSULE, LIQUID FILLED ORAL 2 TIMES DAILY PRN
Qty: 180 CAPSULE | Refills: 1 | Status: SHIPPED | OUTPATIENT
Start: 2024-02-28

## 2024-02-28 RX ORDER — VENLAFAXINE HYDROCHLORIDE 150 MG/1
150 CAPSULE, EXTENDED RELEASE ORAL DAILY
Qty: 90 CAPSULE | Refills: 1 | Status: SHIPPED | OUTPATIENT
Start: 2024-02-28

## 2024-02-28 RX ORDER — CETIRIZINE HYDROCHLORIDE 10 MG/1
10 TABLET ORAL
Qty: 90 TABLET | Refills: 1 | Status: SHIPPED | OUTPATIENT
Start: 2024-02-28

## 2024-02-28 NOTE — PROGRESS NOTES
Follow Up Office Visit      Patient Name: Luis Masterson  : 1965   MRN: [id number]     Chief Complaint:    Chief Complaint   Patient presents with    Chronic Kidney Disease    Hyperlipidemia    Anxiety    impaired fasting glucose    Depression       History of Present Illness: Luis Masterson is a 59 y.o. female who is here today to follow up for  IFG, hyperlipidemia, anxiety, allergies.  Review lab results     mammogram- 3/2023  Pap-10/2021  Labs- 2024  Cologuard 2023    Depression improved with effexor 150 mg daily     Feels her glucose and cholesterol is elevated, she ran out of her saxenda, weight gain of 14 lbs in the past 3 months   Tried wegovy in the past did not lose weight     Pt reports taking otc nsaids x1 week, hurt shoulder right side, not sure what she did to it, but better now    Subjective      Review of Systems:   Review of Systems   Constitutional:  Negative for fever.   HENT:  Negative for ear pain and sore throat.    Respiratory:  Negative for cough.    Cardiovascular:  Negative for chest pain.   Gastrointestinal:  Negative for abdominal pain, constipation, diarrhea, nausea and vomiting.   Genitourinary:  Negative for dysuria.        Past Medical History:   Past Medical History:   Diagnosis Date    CTS (carpal tunnel syndrome)     Kidney stones     Night sweats     PONV (postoperative nausea and vomiting)     scope works well per pt.    Swelling of knee joint, left 2022    Tear of meniscus of knee     Vaginal cancer        Past Surgical History:   Past Surgical History:   Procedure Laterality Date    CARPAL TUNNEL RELEASE      CHOLECYSTECTOMY      ENDOMETRIAL ABLATION      HAND SURGERY      HYSTERECTOMY      KIDNEY STONE SURGERY      KNEE ARTHROSCOPY W/ MENISCECTOMY Left 2022    Procedure: KNEE ARTHROSCOPY WITH PARTIAL MEDIAL MENISCECTOMY, POSSIBLE CHONDROPLASTY;  Surgeon: Lloyd Robles MD;  [place] [facility] [place] [facility] [place]  Service: Orthopedics;  Laterality: Left;    KNEE  "SURGERY  22    NECK SURGERY      THORACIC OUTLET SURGERY         Family History:   Family History   Problem Relation Age of Onset    Breast cancer Other     Stroke Other     Heart disease Other     Diabetes Other     Cancer Mother     Diabetes Mother     Osteoporosis Mother     Malig Hyperthermia Neg Hx        Social History:   Social History     Socioeconomic History    Marital status:    Tobacco Use    Smoking status: Former     Packs/day: 0.50     Years: 15.00     Additional pack years: 0.00     Total pack years: 7.50     Types: Cigarettes     Quit date: 2011     Years since quittin.1     Passive exposure: Never    Smokeless tobacco: Never   Vaping Use    Vaping Use: Never used   Substance and Sexual Activity    Alcohol use: Never    Drug use: Never    Sexual activity: Defer       Medications:     Current Outpatient Medications:     cetirizine (zyrTEC) 10 MG tablet, Take 1 tablet by mouth every night at bedtime., Disp: 90 tablet, Rfl: 1    docusate sodium (Colace) 100 MG capsule, Take 1 capsule by mouth 2 (Two) Times a Day As Needed for Constipation., Disp: 180 capsule, Rfl: 1    fluticasone (FLONASE) 50 MCG/ACT nasal spray, 2 sprays into the nostril(s) as directed by provider Daily., Disp: 16 g, Rfl: 5    Insulin Pen Needle (Pen Needles 3/16\") 31G X 5 MM misc, 1 each Every Night., Disp: 100 each, Rfl: 2    Liraglutide (SAXENDA) 18 MG/3ML injection pen, Inject 0.6mg under skin daily for week one, THEN 1.2mg daily for week two, THEN 1.8mg daily for week three, then 2.4mg daily for week four., Disp: 15 mL, Rfl: 0    melatonin 5 MG tablet tablet, 1 tablet., Disp: , Rfl:     ondansetron (ZOFRAN) 4 MG tablet, 1 tablet., Disp: , Rfl:     valACYclovir (Valtrex) 1000 MG tablet, Take 1 tablet by mouth 2 (Two) Times a Day. (Patient taking differently: Take 1 tablet by mouth As Needed.), Disp: 60 tablet, Rfl: 2    venlafaxine XR (Effexor XR) 150 MG 24 hr capsule, Take 1 capsule by mouth Daily., Disp: " "90 capsule, Rfl: 1    Allergies:   Allergies   Allergen Reactions    Sulfa Antibiotics Swelling     Facial and eye swelling            PHQ-2 Total Score: 0   PHQ-9 Total Score: 0     Objective     Physical Exam:  Vital Signs:   Vitals:    02/28/24 0942   BP: 129/83   Pulse: 68   Temp: 97.2 °F (36.2 °C)   SpO2: 98%   Weight: 80.7 kg (178 lb)   Height: 154.9 cm (61\")     Body mass index is 33.63 kg/m².           Physical Exam  HENT:      Right Ear: Tympanic membrane normal.      Left Ear: Tympanic membrane normal.      Nose: Nose normal.      Mouth/Throat:      Mouth: Mucous membranes are moist.   Eyes:      Conjunctiva/sclera: Conjunctivae normal.   Neck:      Vascular: No carotid bruit.   Cardiovascular:      Rate and Rhythm: Normal rate and regular rhythm.      Heart sounds: Normal heart sounds. No murmur heard.  Pulmonary:      Effort: Pulmonary effort is normal.      Breath sounds: Normal breath sounds.   Abdominal:      General: Bowel sounds are normal.      Palpations: Abdomen is soft.   Musculoskeletal:      Right lower leg: No edema.      Left lower leg: No edema.   Skin:     General: Skin is warm and dry.   Neurological:      Mental Status: She is alert.   Psychiatric:         Mood and Affect: Mood normal.         Behavior: Behavior normal.             Assessment / Plan      Assessment/Plan:   Diagnoses and all orders for this visit:    1. Mixed hyperlipidemia (Primary)  -     Lipid Panel    2. Impaired fasting glucose  -     Comprehensive Metabolic Panel  -     Hemoglobin A1c    3. Class 1 obesity due to excess calories without serious comorbidity with body mass index (BMI) of 32.0 to 32.9 in adult    4. Elevated liver enzymes  -     Comprehensive Metabolic Panel    5. Fatty liver    6. Anxiety    7. Current mild episode of major depressive disorder without prior episode    8. Renal insufficiency    9. Constipation, unspecified constipation type    Other orders  -     cetirizine (zyrTEC) 10 MG tablet; Take " "1 tablet by mouth every night at bedtime.  Dispense: 90 tablet; Refill: 1  -     docusate sodium (Colace) 100 MG capsule; Take 1 capsule by mouth 2 (Two) Times a Day As Needed for Constipation.  Dispense: 180 capsule; Refill: 1  -     venlafaxine XR (Effexor XR) 150 MG 24 hr capsule; Take 1 capsule by mouth Daily.  Dispense: 90 capsule; Refill: 1  -     Liraglutide (SAXENDA) 18 MG/3ML injection pen; Inject 0.6mg under skin daily for week one, THEN 1.2mg daily for week two, THEN 1.8mg daily for week three, then 2.4mg daily for week four.  Dispense: 15 mL; Refill: 0         Hyperlipidemia lipidemia discussed cutting out fried foods red meat pork cheese increasing fruits vegetables whole grains exercise 30 minutes daily and weight loss  Impaired fasting glucose patient is at risk for diabetes discussed with patient and she is a prediabetic reducing added carbs sugars exercise 30 minutes daily weight loss  Class I obesity with serious comorbidity we will start Saxenda recommend reducing overall caloric intake and exercising 30 minutes daily weight loss  Elevated liver enzymes history of fatty liver discussed cutting out fried foods cutting out processed foods increasing fiber in diet and exercising 30 minutes daily for weight loss  Renal insufficiency avoid all NSAIDs increase water intake we will reassess in 90 days  Constipation currently controlled with Colace      Follow Up:   Return in about 3 months (around 5/28/2024).    Melinda Torres, RODGER    \"Please note that portions of this note were completed with a voice recognition program.\"    "

## 2024-03-13 ENCOUNTER — HOSPITAL ENCOUNTER (OUTPATIENT)
Dept: MAMMOGRAPHY | Facility: HOSPITAL | Age: 59
Discharge: HOME | End: 2024-03-13
Admitting: NURSE PRACTITIONER
Payer: COMMERCIAL

## 2024-03-13 DIAGNOSIS — Z12.31 SCREENING MAMMOGRAM FOR BREAST CANCER: ICD-10-CM

## 2024-03-13 PROCEDURE — 77063 BREAST TOMOSYNTHESIS BI: CPT

## 2024-03-13 PROCEDURE — 77067 SCR MAMMO BI INCL CAD: CPT

## 2024-04-01 ENCOUNTER — PATIENT MESSAGE (OUTPATIENT)
Dept: FAMILY MEDICINE CLINIC | Facility: CLINIC | Age: 59
End: 2024-04-01
Payer: COMMERCIAL

## 2024-05-07 DIAGNOSIS — M25.462 PAIN AND SWELLING OF LEFT KNEE: ICD-10-CM

## 2024-05-07 DIAGNOSIS — M25.562 PAIN AND SWELLING OF LEFT KNEE: ICD-10-CM

## 2024-05-07 DIAGNOSIS — M25.562 ACUTE PAIN OF LEFT KNEE: ICD-10-CM

## 2024-05-07 DIAGNOSIS — M25.362 INSTABILITY OF LEFT KNEE JOINT: ICD-10-CM

## 2024-05-07 DIAGNOSIS — M17.12 TRICOMPARTMENT OSTEOARTHRITIS OF LEFT KNEE: Primary | ICD-10-CM

## 2024-05-27 RX ORDER — TIRZEPATIDE 2.5 MG/.5ML
2.5 INJECTION, SOLUTION SUBCUTANEOUS WEEKLY
Qty: 2 ML | Refills: 0 | Status: CANCELLED | OUTPATIENT
Start: 2024-05-27

## 2024-05-28 ENCOUNTER — PATIENT MESSAGE (OUTPATIENT)
Dept: FAMILY MEDICINE CLINIC | Facility: CLINIC | Age: 59
End: 2024-05-28
Payer: COMMERCIAL

## 2024-05-28 ENCOUNTER — OFFICE VISIT (OUTPATIENT)
Dept: FAMILY MEDICINE CLINIC | Facility: CLINIC | Age: 59
End: 2024-05-28
Payer: COMMERCIAL

## 2024-05-28 VITALS
BODY MASS INDEX: 32.85 KG/M2 | WEIGHT: 174 LBS | DIASTOLIC BLOOD PRESSURE: 78 MMHG | HEART RATE: 74 BPM | OXYGEN SATURATION: 98 % | SYSTOLIC BLOOD PRESSURE: 112 MMHG | HEIGHT: 61 IN | TEMPERATURE: 97.9 F

## 2024-05-28 DIAGNOSIS — E78.2 MIXED HYPERLIPIDEMIA: ICD-10-CM

## 2024-05-28 DIAGNOSIS — F41.9 ANXIETY: ICD-10-CM

## 2024-05-28 DIAGNOSIS — F32.0 CURRENT MILD EPISODE OF MAJOR DEPRESSIVE DISORDER WITHOUT PRIOR EPISODE: ICD-10-CM

## 2024-05-28 DIAGNOSIS — R74.8 ELEVATED LIVER ENZYMES: ICD-10-CM

## 2024-05-28 DIAGNOSIS — K59.00 CONSTIPATION, UNSPECIFIED CONSTIPATION TYPE: ICD-10-CM

## 2024-05-28 DIAGNOSIS — Z00.00 ANNUAL PHYSICAL EXAM: Primary | ICD-10-CM

## 2024-05-28 DIAGNOSIS — R73.01 IMPAIRED FASTING GLUCOSE: ICD-10-CM

## 2024-05-28 DIAGNOSIS — E66.09 CLASS 1 OBESITY DUE TO EXCESS CALORIES WITHOUT SERIOUS COMORBIDITY WITH BODY MASS INDEX (BMI) OF 32.0 TO 32.9 IN ADULT: ICD-10-CM

## 2024-05-28 DIAGNOSIS — J30.2 SEASONAL ALLERGIES: ICD-10-CM

## 2024-05-28 PROCEDURE — 99214 OFFICE O/P EST MOD 30 MIN: CPT | Performed by: NURSE PRACTITIONER

## 2024-05-28 RX ORDER — CETIRIZINE HYDROCHLORIDE 10 MG/1
10 TABLET ORAL
Qty: 90 TABLET | Refills: 1 | Status: SHIPPED | OUTPATIENT
Start: 2024-05-28

## 2024-05-28 RX ORDER — VENLAFAXINE HYDROCHLORIDE 75 MG/1
75 CAPSULE, EXTENDED RELEASE ORAL DAILY
Qty: 90 CAPSULE | Refills: 1 | Status: SHIPPED | OUTPATIENT
Start: 2024-05-28

## 2024-05-28 RX ORDER — VENLAFAXINE HYDROCHLORIDE 150 MG/1
150 CAPSULE, EXTENDED RELEASE ORAL DAILY
Qty: 90 CAPSULE | Refills: 1 | Status: SHIPPED | OUTPATIENT
Start: 2024-05-28 | End: 2024-05-28

## 2024-05-28 RX ORDER — DOCUSATE SODIUM 100 MG/1
100 CAPSULE, LIQUID FILLED ORAL 2 TIMES DAILY PRN
Qty: 180 CAPSULE | Refills: 1 | Status: SHIPPED | OUTPATIENT
Start: 2024-05-28

## 2024-05-28 NOTE — PROGRESS NOTES
Patient Name: Luis Masterson  : 1965   MRN: [id number]     Chief Complaint:    Chief Complaint   Patient presents with    Hyperlipidemia    Anxiety    impaired fasting glucose    Depression    Annual Exam       History of Present Illness: Luis Masterson is a 59 y.o. female who is here today to annual physical     Also follow up for IFG, hyperlipidemia, anxiety, allergies.       mammogram- 3/2024  Pap-10/2021  Labs- 2024  Cologuard 2023    C/o She would like to discuss the next dose of Zepbound and then other options after Zepbound isn't covered.    saxenda, weight gain of 14 lbs in the past 3 months   Tried wegovy in the past did not lose weight     Follow up urgent care 2024   Knee Pain       L KNEE PAIN-X5 DAYS- HX OF L KNEE JOINT SWELLING AND TORN MENISCUS-      History of Present Illness  59-year-old female presents today with complaint of left knee pain x 5 days.  Patient reports a history of left knee joint swelling and prior torn meniscus that was repaired approximately 18 months ago.  Patient describes pain along the medial aspect of the left knee with small amount of swelling.  Patient reports that her knee sometimes feels like it is going to give out on her.  She has not had any issues with weakness or falling.      MRI schedule along with follow up with orthopedic surgery    Also decreased effexor to 75 mg daily, 150 mg caused stomach upset, feels the 75 mg does well with her anxiety and depression without stomach issues     Subjective      Review of Systems:   Review of Systems   Constitutional:  Negative for fever.   HENT:  Negative for ear pain and sore throat.    Respiratory:  Negative for cough.    Cardiovascular:  Negative for chest pain.   Gastrointestinal:  Negative for abdominal pain, constipation, diarrhea, nausea and vomiting.   Genitourinary:  Negative for dysuria.   Musculoskeletal:  Positive for arthralgias. Negative for myalgias.        Past Medical History:   Past  Medical History:   Diagnosis Date    CTS (carpal tunnel syndrome)     Kidney stones     Night sweats     PONV (postoperative nausea and vomiting)     scope works well per pt.    Swelling of knee joint, left 2022    Tear of meniscus of knee     L KNEE    Vaginal cancer        Past Surgical History:   Past Surgical History:   Procedure Laterality Date    CARPAL TUNNEL RELEASE      CHOLECYSTECTOMY      ENDOMETRIAL ABLATION      HAND SURGERY      HYSTERECTOMY      KIDNEY STONE SURGERY      KNEE ARTHROSCOPY W/ MENISCECTOMY Left 2022    Procedure: KNEE ARTHROSCOPY WITH PARTIAL MEDIAL MENISCECTOMY, POSSIBLE CHONDROPLASTY;  Surgeon: Lloyd Robles MD;  [place] [facility] [place] [facility] [place]  Service: Orthopedics;  Laterality: Left;    KNEE SURGERY  22    NECK SURGERY      THORACIC OUTLET SURGERY         Family History:   Family History   Problem Relation Age of Onset    Breast cancer Other     Stroke Other     Heart disease Other     Diabetes Other     Cancer Mother     Diabetes Mother     Osteoporosis Mother     Malig Hyperthermia Neg Hx        Social History:   Social History     Socioeconomic History    Marital status:    Tobacco Use    Smoking status: Former     Current packs/day: 0.00     Average packs/day: 0.5 packs/day for 15.0 years (7.5 ttl pk-yrs)     Types: Cigarettes     Start date: 1996     Quit date: 2011     Years since quittin.4     Passive exposure: Never    Smokeless tobacco: Never   Vaping Use    Vaping status: Never Used   Substance and Sexual Activity    Alcohol use: Never    Drug use: Never    Sexual activity: Defer       Medications:     Current Outpatient Medications:     cetirizine (zyrTEC) 10 MG tablet, Take 1 tablet by mouth every night at bedtime., Disp: 90 tablet, Rfl: 1    docusate sodium (Colace) 100 MG capsule, Take 1 capsule by mouth 2 (Two) Times a Day As Needed for Constipation., Disp: 180 capsule, Rfl: 1    fluticasone (FLONASE) 50 MCG/ACT nasal spray, 2  "sprays into the nostril(s) as directed by provider Daily., Disp: 16 g, Rfl: 5    Insulin Pen Needle (Pen Needles 3/16\") 31G X 5 MM misc, 1 each Every Night., Disp: 100 each, Rfl: 2    melatonin 5 MG tablet tablet, 1 tablet., Disp: , Rfl:     ondansetron (ZOFRAN) 4 MG tablet, 1 tablet., Disp: , Rfl:     valACYclovir (Valtrex) 1000 MG tablet, Take 1 tablet by mouth 2 (Two) Times a Day. (Patient taking differently: Take 1 tablet by mouth As Needed.), Disp: 60 tablet, Rfl: 2    Tirzepatide-Weight Management (ZEPBOUND) 5 MG/0.5ML solution auto-injector, Inject 0.5 mL under the skin into the appropriate area as directed 1 (One) Time Per Week., Disp: 2 mL, Rfl: 5    venlafaxine XR (Effexor XR) 75 MG 24 hr capsule, Take 1 capsule by mouth Daily., Disp: 90 capsule, Rfl: 1    Allergies:   Allergies   Allergen Reactions    Sulfa Antibiotics Swelling     Facial and eye swelling                Objective     Physical Exam:  Vital Signs:   Vitals:    05/28/24 1037   BP: 112/78   Pulse: 74   Temp: 97.9 °F (36.6 °C)   SpO2: 98%   Weight: 78.9 kg (174 lb)   Height: 154.9 cm (61\")     Body mass index is 32.88 kg/m².           Physical Exam  HENT:      Right Ear: Tympanic membrane normal.      Left Ear: Tympanic membrane normal.      Nose: Nose normal.      Mouth/Throat:      Mouth: Mucous membranes are moist.   Eyes:      Conjunctiva/sclera: Conjunctivae normal.   Neck:      Vascular: No carotid bruit.   Cardiovascular:      Rate and Rhythm: Normal rate and regular rhythm.      Heart sounds: Normal heart sounds. No murmur heard.  Pulmonary:      Effort: Pulmonary effort is normal.      Breath sounds: Normal breath sounds.   Abdominal:      General: Bowel sounds are normal.      Palpations: Abdomen is soft.   Musculoskeletal:      Right lower leg: No edema.      Left lower leg: No edema.      Comments: Mild swelling left knee with crepitus   Skin:     General: Skin is warm and dry.   Neurological:      Mental Status: She is alert. "   Psychiatric:         Mood and Affect: Mood normal.         Behavior: Behavior normal.             Assessment / Plan      Assessment/Plan:   Diagnoses and all orders for this visit:    1. Annual physical exam (Primary)    2. Mixed hyperlipidemia    3. Impaired fasting glucose    4. Class 1 obesity due to excess calories without serious comorbidity with body mass index (BMI) of 32.0 to 32.9 in adult    5. Constipation, unspecified constipation type    6. Elevated liver enzymes    7. Anxiety    8. Current mild episode of major depressive disorder without prior episode    9. Seasonal allergies    Other orders  -     cetirizine (zyrTEC) 10 MG tablet; Take 1 tablet by mouth every night at bedtime.  Dispense: 90 tablet; Refill: 1  -     docusate sodium (Colace) 100 MG capsule; Take 1 capsule by mouth 2 (Two) Times a Day As Needed for Constipation.  Dispense: 180 capsule; Refill: 1  -     Discontinue: venlafaxine XR (Effexor XR) 150 MG 24 hr capsule; Take 1 capsule by mouth Daily.  Dispense: 90 capsule; Refill: 1  -     Tirzepatide-Weight Management (ZEPBOUND) 5 MG/0.5ML solution auto-injector; Inject 0.5 mL under the skin into the appropriate area as directed 1 (One) Time Per Week.  Dispense: 2 mL; Refill: 5  -     venlafaxine XR (Effexor XR) 75 MG 24 hr capsule; Take 1 capsule by mouth Daily.  Dispense: 90 capsule; Refill: 1         Annual physical exam immunizations screening reviewed with patient recommend exercise 30 daily routine dental and eye exams avoiding alcohol and smoking mammogram up-to-date Cologuard up-to-date history of hysterectomy  Hyperlipidemia will obtain lipid panel to monitor rec reduce fried foods red meat pork cheese increase fruits vegetables whole grains exercise 30 minutes daily weight loss  Impaired fasting glucose obtain hemoglobin A1c to monitor recommend reduce added carbs sugars exercise 30 minutes daily weight loss  Class I obesity will provide a refill in the neck step and is at bound  "at 5 mg as insurance will no longer cover weight loss medications as of July 31 do recommend patient starting a program such as weight watchers to help aid in portion control and better eating habits  Constipation currently controlled Colace will provide refill  Elevated liver enzymes will obtain CMP to monitor  Anxiety and depression currently stable on Effexor 75 mg daily prior to refill  Seasonal allergies currently controlled with Zyrtec      Follow Up:   Return in about 6 months (around 11/28/2024).    Melinda Torres, APRN    \"Please note that portions of this note were completed with a voice recognition program.\"    "

## 2024-06-03 RX ORDER — FLUTICASONE PROPIONATE 50 MCG
2 SPRAY, SUSPENSION (ML) NASAL DAILY
Qty: 16 G | Refills: 5 | Status: SHIPPED | OUTPATIENT
Start: 2024-06-03

## 2024-06-06 ENCOUNTER — HOSPITAL ENCOUNTER (OUTPATIENT)
Dept: MRI IMAGING | Facility: HOSPITAL | Age: 59
Discharge: HOME | End: 2024-06-06
Payer: COMMERCIAL

## 2024-06-06 DIAGNOSIS — M25.362 INSTABILITY OF LEFT KNEE JOINT: ICD-10-CM

## 2024-06-06 DIAGNOSIS — M25.462 PAIN AND SWELLING OF LEFT KNEE: ICD-10-CM

## 2024-06-06 DIAGNOSIS — M25.562 PAIN AND SWELLING OF LEFT KNEE: ICD-10-CM

## 2024-06-06 DIAGNOSIS — M17.12 TRICOMPARTMENT OSTEOARTHRITIS OF LEFT KNEE: ICD-10-CM

## 2024-06-06 PROCEDURE — 73721 MRI JNT OF LWR EXTRE W/O DYE: CPT

## 2024-06-12 ENCOUNTER — OFFICE VISIT (OUTPATIENT)
Dept: ORTHOPEDIC SURGERY | Facility: CLINIC | Age: 59
End: 2024-06-12
Payer: COMMERCIAL

## 2024-06-12 VITALS
SYSTOLIC BLOOD PRESSURE: 131 MMHG | DIASTOLIC BLOOD PRESSURE: 84 MMHG | WEIGHT: 172 LBS | BODY MASS INDEX: 32.47 KG/M2 | HEIGHT: 61 IN | OXYGEN SATURATION: 97 % | HEART RATE: 80 BPM

## 2024-06-12 DIAGNOSIS — M25.562 LEFT KNEE PAIN, UNSPECIFIED CHRONICITY: Primary | ICD-10-CM

## 2024-06-12 DIAGNOSIS — M23.204 OLD COMPLEX TEAR OF MEDIAL MENISCUS OF LEFT KNEE: ICD-10-CM

## 2024-06-12 RX ORDER — LIDOCAINE HYDROCHLORIDE 10 MG/ML
5 INJECTION, SOLUTION INFILTRATION; PERINEURAL
Status: COMPLETED | OUTPATIENT
Start: 2024-06-12 | End: 2024-06-12

## 2024-06-12 RX ORDER — TRIAMCINOLONE ACETONIDE 40 MG/ML
40 INJECTION, SUSPENSION INTRA-ARTICULAR; INTRAMUSCULAR
Status: COMPLETED | OUTPATIENT
Start: 2024-06-12 | End: 2024-06-12

## 2024-06-12 RX ADMIN — LIDOCAINE HYDROCHLORIDE 5 ML: 10 INJECTION, SOLUTION INFILTRATION; PERINEURAL at 13:13

## 2024-06-12 RX ADMIN — TRIAMCINOLONE ACETONIDE 40 MG: 40 INJECTION, SUSPENSION INTRA-ARTICULAR; INTRAMUSCULAR at 13:13

## 2024-06-12 NOTE — PROGRESS NOTES
"Chief Complaint  Initial Evaluation of the Left Knee     Subjective      Luis Masterson presents to [facility] for initial evaluation of the left knee.  She has pain on the medial aspect of the knee.  She notes throbbing and \"hot pokers\" sticking in there. She notes her knee gives way at times.  She has had a left knee arthroscopy with partial medial meniscectomy and medial femoral chondroplasty performed on 2022 . She had physical therapy in the past.     Allergies   Allergen Reactions    Sulfa Antibiotics Swelling     Facial and eye swelling         Social History     Socioeconomic History    Marital status:    Tobacco Use    Smoking status: Former     Current packs/day: 0.00     Average packs/day: 0.5 packs/day for 15.0 years (7.5 ttl pk-yrs)     Types: Cigarettes     Start date: 1996     Quit date: 2011     Years since quittin.4     Passive exposure: Never    Smokeless tobacco: Never   Vaping Use    Vaping status: Never Used   Substance and Sexual Activity    Alcohol use: Never    Drug use: Never    Sexual activity: Defer        I reviewed the patient's chief complaint, history of present illness, review of systems, past medical history, surgical history, family history, social history, medications, and allergy list.     Review of Systems     Constitutional: Denies fevers, chills, weight loss  Cardiovascular: Denies chest pain, shortness of breath  Skin: Denies rashes, acute skin changes  Neurologic: Denies headache, loss of consciousness        Vital Signs:   /84   Pulse 80   Ht 154.9 cm (61\")   Wt 78 kg (172 lb)   SpO2 97%   BMI 32.50 kg/m²          Physical Exam  General: Alert. No acute distress    Ortho Exam        LEFT KNEE Flexion 115. Extension 0. Stable to varus/valgus stress. Stable to anterior/posterior drawer. Neurovascularly intact. Positive Nikki. Negative Lachman. Positive EHL, FHL, HS and TA. Sensation intact to light touch " all 5 nerves of the foot. Ambulates with Antalgic gait. Patella is well tracking. Calf supple, non-tender. Positive tenderness to the medial joint line. Negative tenderness to the lateral joint line. Negative Crepitus. Good strength to hamstrings, quadriceps, dorsiflexors, and plantar flexors.  Knee Extensor Mechanism intact       Large Joint Arthrocentesis: L knee  Date/Time: 6/12/2024 1:13 PM  Consent given by: patient  Site marked: site marked  Timeout: Immediately prior to procedure a time out was called to verify the correct patient, procedure, equipment, support staff and site/side marked as required   Supporting Documentation  Indications: pain   Procedure Details  Location: knee - L knee  Needle gauge: 21 G.  Medications administered: 5 mL lidocaine 1 %; 40 mg triamcinolone acetonide 40 MG/ML  Patient tolerance: patient tolerated the procedure well with no immediate complications        This injection documentation was Scribed for Lloyd Robles MD by Tin Hansen.  06/12/24   13:13 EDT      Imaging Results (Most Recent)       None             Result Review :     MRI Knee Left Without Contrast    Result Date: 6/6/2024  Narrative: MRI KNEE LEFT  WO CONTRAST Date of Exam: 6/6/2024 2:34 PM EDT Indication: Left knee pain, swelling, and instability..  Comparison: Left knee radiographs 5/5/2024, left knee MRI 8/10/2022 Technique:  Routine multiplanar/multisequence images of the left knee were obtained without contrast administration. Findings: Review of electronic medical record indicates prior left knee arthroscopy with medial meniscectomy and chondroplasty of the medial femoral condyle on 9/22/2022. There is some scarring and micrometallic susceptibility within Hoffa's fat compatible with prior knee arthroscopy. There is some irregularity of the inner-margin of the posterior horn of the medial meniscus which may reflect sequelae of prior meniscectomy. There is similar appearance of a longitudinal horizontal tear  of the posterior horn of the medial meniscus which extends to the tibial surface (series 7 image 19). No displaced meniscal flap. The remainder of the medial meniscus appears unremarkable. There is a linear chondral defect along the central and posterior aspect of the medial femoral condyle (series 5 image 11-14, series 4 image 19), new from prior; it is difficult to determine if this reflects changes of reported chondroplasty versus new moderate-grade chondral defect. Otherwise the articular cartilage of the medial compartment appears grossly unremarkable. The medial supporting structures are normal. Normal appearance of the lateral meniscus. The articular cartilage of the lateral compartment appears grossly preserved. The lateral supporting structures are normal. The anterior cruciate ligament and posterior cruciate ligament are normal. The quadriceps tendon and patellar tendon are normal. There is moderate focal fissuring of the central trochlear cartilage (series 4 image 13), likely new compared to prior MRI. Otherwise the patellofemoral cartilage appears grossly preserved. There is a trace knee joint effusion. No popliteal cyst. The posterior knee neurovasculature is unremarkable. Normal appearance of the muscles and subcutaneous tissues.     Impression: Impression: Changes of prior arthroscopy. Subtle changes of partial medial meniscectomy. Redemonstration of a longitudinal oblique tear of the posterior horn of the medial meniscus. There is a thin moderate-grade chondral defect along the central and posterior aspect of the medial femoral condyle; a component of this may reflect changes relating to prior chondroplasty and correlate with surgical history, however it is difficult to exclude the possibility of a deep chondral fissure/defect. Small focal chondral fissure of the central trochlear cartilage. Trace knee joint effusion. Electronically Signed: Mike Teague MD  6/6/2024 4:17 PM EDT  Workstation ID:  [id number]            Assessment and Plan     Diagnoses and all orders for this visit:    1. Left knee pain, unspecified chronicity (Primary)  -     Large Joint Arthrocentesis: L knee    2. Old complex tear of medial meniscus of left knee        Discussed the treatment plan with the patient. I reviewed the MRI results with the patient.     Discussed the treatment options with the patient, operative vs non-operative.The patient may benefit from an arthroscopy if injection is not helpful.     Discussed the risks and benefits of conservative measures. The patient expressed understanding and wished to proceed with a left knee steroid injection.  She tolerated the injection well.     Call or return if worsening symptoms.    Follow Up     PRN She will call back if injection was not helpful.       Patient was given instructions and counseling regarding her condition or for health maintenance advice. Please see specific information pulled into the AVS if appropriate.     Scribed for Lloyd Robles MD by Gisele Patrick MA.  06/12/24   12:56 EDT    I have personally performed the services described in this document as scribed by the above individual and it is both accurate and complete. Lloyd Robles MD 06/12/24

## 2024-11-05 ENCOUNTER — OFFICE VISIT (OUTPATIENT)
Dept: FAMILY MEDICINE CLINIC | Facility: CLINIC | Age: 59
End: 2024-11-05
Payer: COMMERCIAL

## 2024-11-05 VITALS
HEIGHT: 61 IN | BODY MASS INDEX: 32.1 KG/M2 | WEIGHT: 170 LBS | OXYGEN SATURATION: 97 % | HEART RATE: 83 BPM | DIASTOLIC BLOOD PRESSURE: 77 MMHG | TEMPERATURE: 97.7 F | SYSTOLIC BLOOD PRESSURE: 122 MMHG

## 2024-11-05 DIAGNOSIS — R05.1 ACUTE COUGH: ICD-10-CM

## 2024-11-05 DIAGNOSIS — J01.90 ACUTE NON-RECURRENT SINUSITIS, UNSPECIFIED LOCATION: ICD-10-CM

## 2024-11-05 DIAGNOSIS — J34.89 SINUS PRESSURE: Primary | ICD-10-CM

## 2024-11-05 LAB
EXPIRATION DATE: NORMAL
FLUAV AG UPPER RESP QL IA.RAPID: NOT DETECTED
FLUBV AG UPPER RESP QL IA.RAPID: NOT DETECTED
INTERNAL CONTROL: NORMAL
Lab: NORMAL
SARS-COV-2 AG UPPER RESP QL IA.RAPID: NOT DETECTED

## 2024-11-05 PROCEDURE — 87428 SARSCOV & INF VIR A&B AG IA: CPT | Performed by: NURSE PRACTITIONER

## 2024-11-05 PROCEDURE — 96372 THER/PROPH/DIAG INJ SC/IM: CPT | Performed by: NURSE PRACTITIONER

## 2024-11-05 PROCEDURE — 99213 OFFICE O/P EST LOW 20 MIN: CPT | Performed by: NURSE PRACTITIONER

## 2024-11-05 RX ORDER — BROMPHENIRAMINE MALEATE, PSEUDOEPHEDRINE HYDROCHLORIDE, AND DEXTROMETHORPHAN HYDROBROMIDE 2; 30; 10 MG/5ML; MG/5ML; MG/5ML
5 SYRUP ORAL 4 TIMES DAILY PRN
Qty: 473 ML | Refills: 0 | Status: SHIPPED | OUTPATIENT
Start: 2024-11-05

## 2024-11-05 RX ORDER — METHYLPREDNISOLONE ACETATE 80 MG/ML
80 INJECTION, SUSPENSION INTRA-ARTICULAR; INTRALESIONAL; INTRAMUSCULAR; SOFT TISSUE ONCE
Status: COMPLETED | OUTPATIENT
Start: 2024-11-05 | End: 2024-11-05

## 2024-11-05 RX ADMIN — METHYLPREDNISOLONE ACETATE 80 MG: 80 INJECTION, SUSPENSION INTRA-ARTICULAR; INTRALESIONAL; INTRAMUSCULAR; SOFT TISSUE at 15:56

## 2024-11-05 NOTE — PROGRESS NOTES
ACUTE VISIT     Patient Name: Luis Masterson  : 1965   MRN: [id number]     Chief Complaint:    Chief Complaint   Patient presents with    Cough    Headache    Nasal Congestion    sinus pressure       History of Present Illness: Luis Masterson is a 59 y.o. female who is here today for cough, headache, nasal congestion, sinus pressure, fever, chills.  Symptoms started . Yellowish green phlem.  Using flonase and zyrtec no relief   Also with teeth pressure, face pain     Subjective      Review of Systems:   Review of Systems   Constitutional:  Positive for chills and fever.   HENT:  Positive for congestion, ear pain, sinus pressure, sinus pain, sore throat and trouble swallowing. Negative for drooling.    Eyes:  Negative for discharge.   Respiratory:  Positive for cough. Negative for shortness of breath.    Cardiovascular:  Negative for chest pain.   Gastrointestinal:  Negative for abdominal pain, diarrhea and vomiting.   Musculoskeletal:  Positive for neck pain.   Neurological:  Positive for headaches.        Past Medical History:   Past Medical History:   Diagnosis Date    CTS (carpal tunnel syndrome)     Kidney stones     Night sweats     PONV (postoperative nausea and vomiting)     scope works well per pt.    Swelling of knee joint, left 2022    Tear of meniscus of knee     L KNEE    Vaginal cancer        Past Surgical History:   Past Surgical History:   Procedure Laterality Date    CARPAL TUNNEL RELEASE      CHOLECYSTECTOMY      ENDOMETRIAL ABLATION      HAND SURGERY      HYSTERECTOMY      KIDNEY STONE SURGERY      KNEE ARTHROSCOPY W/ MENISCECTOMY Left 2022    Procedure: KNEE ARTHROSCOPY WITH PARTIAL MEDIAL MENISCECTOMY, POSSIBLE CHONDROPLASTY;  Surgeon: Lloyd Robles MD;  Location: [facility] OR [facility];  Service: Orthopedics;  Laterality: Left;    KNEE SURGERY  22    NECK SURGERY      THORACIC OUTLET SURGERY         Family History:   Family History   Problem Relation Age of Onset     Breast cancer Other     Stroke Other     Heart disease Other     Diabetes Other     Cancer Mother     Diabetes Mother     Osteoporosis Mother     Malig Hyperthermia Neg Hx        Social History:   Social History     Socioeconomic History    Marital status:    Tobacco Use    Smoking status: Former     Current packs/day: 0.00     Average packs/day: 0.5 packs/day for 15.0 years (7.5 ttl pk-yrs)     Types: Cigarettes     Start date: 1996     Quit date: 2011     Years since quittin.8     Passive exposure: Never    Smokeless tobacco: Never   Vaping Use    Vaping status: Never Used   Substance and Sexual Activity    Alcohol use: Never    Drug use: Never    Sexual activity: Defer       Medications:     Current Outpatient Medications:     cetirizine (zyrTEC) 10 MG tablet, Take 1 tablet by mouth every night at bedtime., Disp: 90 tablet, Rfl: 1    fluticasone (FLONASE) 50 MCG/ACT nasal spray, 2 sprays into the nostril(s) as directed by provider Daily., Disp: 16 g, Rfl: 5    valACYclovir (Valtrex) 1000 MG tablet, Take 1 tablet by mouth 2 (Two) Times a Day. (Patient taking differently: Take 1 tablet by mouth As Needed.), Disp: 60 tablet, Rfl: 2    venlafaxine XR (Effexor XR) 75 MG 24 hr capsule, Take 1 capsule by mouth Daily., Disp: 90 capsule, Rfl: 1    amoxicillin-clavulanate (AUGMENTIN) 875-125 MG per tablet, Take 1 tablet by mouth 2 (Two) Times a Day for 10 days., Disp: 20 tablet, Rfl: 0    brompheniramine-pseudoephedrine-DM 30-2-10 MG/5ML syrup, Take 5 mL by mouth 4 (Four) Times a Day As Needed for Congestion or Cough., Disp: 473 mL, Rfl: 0    Current Facility-Administered Medications:     methylPREDNISolone acetate (DEPO-medrol) injection 80 mg, 80 mg, Intramuscular, Once, Nasima Torres APRN    Allergies:   Allergies   Allergen Reactions    Sulfa Antibiotics Swelling     Facial and eye swelling          Objective     Physical Exam:  Vital Signs:   Vitals:    24 1444   BP: 122/77  "  Pulse: 83   Temp: 97.7 °F (36.5 °C)   SpO2: 97%   Weight: 77.1 kg (170 lb)   Height: 154.9 cm (61\")     Body mass index is 32.12 kg/m².     Physical Exam  HENT:      Right Ear: A middle ear effusion is present. Tympanic membrane is injected.      Left Ear: A middle ear effusion is present. Tympanic membrane is injected.      Nose: Congestion and rhinorrhea present.      Right Turbinates: Enlarged and swollen.      Left Turbinates: Enlarged and swollen.      Right Sinus: Maxillary sinus tenderness and frontal sinus tenderness present.      Left Sinus: Maxillary sinus tenderness and frontal sinus tenderness present.      Mouth/Throat:      Mouth: Mucous membranes are moist.   Eyes:      Conjunctiva/sclera:      Right eye: Right conjunctiva is injected.      Left eye: Left conjunctiva is injected.   Cardiovascular:      Rate and Rhythm: Normal rate and regular rhythm.      Heart sounds: Normal heart sounds. No murmur heard.  Pulmonary:      Effort: Pulmonary effort is normal.      Breath sounds: Normal breath sounds.   Lymphadenopathy:      Cervical: No cervical adenopathy.   Skin:     General: Skin is warm and dry.   Neurological:      Mental Status: She is alert.             Assessment / Plan      Assessment/Plan:   Diagnoses and all orders for this visit:    1. Sinus pressure (Primary)  -     POCT SARS-CoV-2 Antigen MIS + Flu  -     methylPREDNISolone acetate (DEPO-medrol) injection 80 mg    2. Acute non-recurrent sinusitis, unspecified location  -     methylPREDNISolone acetate (DEPO-medrol) injection 80 mg    3. Acute cough    Other orders  -     amoxicillin-clavulanate (AUGMENTIN) 875-125 MG per tablet; Take 1 tablet by mouth 2 (Two) Times a Day for 10 days.  Dispense: 20 tablet; Refill: 0  -     brompheniramine-pseudoephedrine-DM 30-2-10 MG/5ML syrup; Take 5 mL by mouth 4 (Four) Times a Day As Needed for Congestion or Cough.  Dispense: 473 mL; Refill: 0           Sinus pressure with cough flu COVID-negative " acute sinusitis we will treat with Augmentin Depo-Medrol 80 Bromfed-DM for cough continue Flonase Zyrtec at this time    Follow Up:   Return if symptoms worsen or fail to improve.    Melinda Torres, APRN      Please note that portions of this note were completed with a voice recognition program.

## 2024-11-27 NOTE — PROGRESS NOTES
Follow Up Office Visit      Patient Name: Luis Masterson  : 1965   MRN: [id number]     Chief Complaint:    Chief Complaint   Patient presents with    Hyperlipidemia    Anxiety    impaired fasting glucose       History of Present Illness: Luis Masterson is a 59 y.o. female who is here today to follow up for ollow up for IFG, hyperlipidemia, anxiety, allergies, evaded liver enzymes and renal insufficiency        mammogram- 3/2024  Pap-10/2021  Labs- 2024  Cologuard 2023    Pt c/o worsening urinary urgency, incontinence, debating bladder lift       Subjective      Review of Systems:   Review of Systems   Constitutional:  Negative for chills, diaphoresis, fatigue and fever.   HENT:  Negative for congestion and sore throat.    Respiratory:  Negative for cough.    Cardiovascular:  Negative for chest pain.   Gastrointestinal:  Negative for abdominal pain, nausea and vomiting.   Genitourinary:  Negative for dysuria.   Musculoskeletal:  Negative for myalgias and neck pain.   Skin:  Negative for rash.   Neurological:  Negative for weakness, numbness and headaches.        Past Medical History:   Past Medical History:   Diagnosis Date    CTS (carpal tunnel syndrome)     Kidney stones     Night sweats     PONV (postoperative nausea and vomiting)     scope works well per pt.    Swelling of knee joint, left 2022    Tear of meniscus of knee     L KNEE    Vaginal cancer        Past Surgical History:   Past Surgical History:   Procedure Laterality Date    CARPAL TUNNEL RELEASE      CHOLECYSTECTOMY      ENDOMETRIAL ABLATION      HAND SURGERY      HYSTERECTOMY      KIDNEY STONE SURGERY      KNEE ARTHROSCOPY W/ MENISCECTOMY Left 2022    Procedure: KNEE ARTHROSCOPY WITH PARTIAL MEDIAL MENISCECTOMY, POSSIBLE CHONDROPLASTY;  Surgeon: Lloyd Robles MD;  [facility];  Service: Orthopedics;  Laterality: Left;    KNEE SURGERY  22    NECK SURGERY      THORACIC OUTLET SURGERY         Family  "History:   Family History   Problem Relation Age of Onset    Breast cancer Other     Stroke Other     Heart disease Other     Diabetes Other     Cancer Mother     Diabetes Mother     Osteoporosis Mother     Malig Hyperthermia Neg Hx        Social History:   Social History     Socioeconomic History    Marital status:    Tobacco Use    Smoking status: Former     Current packs/day: 0.00     Average packs/day: 0.5 packs/day for 15.0 years (7.5 ttl pk-yrs)     Types: Cigarettes     Start date: 1996     Quit date: 2011     Years since quittin.9     Passive exposure: Never    Smokeless tobacco: Never   Vaping Use    Vaping status: Never Used   Substance and Sexual Activity    Alcohol use: Never    Drug use: Never    Sexual activity: Defer       Medications:     Current Outpatient Medications:     cetirizine (zyrTEC) 10 MG tablet, Take 1 tablet by mouth every night at bedtime., Disp: 90 tablet, Rfl: 1    fluticasone (FLONASE) 50 MCG/ACT nasal spray, 2 sprays into the nostril(s) as directed by provider Daily., Disp: 16 g, Rfl: 5    valACYclovir (Valtrex) 1000 MG tablet, Take 1 tablet by mouth 2 (Two) Times a Day. (Patient taking differently: Take 1 tablet by mouth As Needed.), Disp: 60 tablet, Rfl: 2    venlafaxine XR (Effexor XR) 75 MG 24 hr capsule, Take 1 capsule by mouth Daily., Disp: 90 capsule, Rfl: 1    Estrogens Conjugated (Premarin) 0.625 MG/GM vaginal cream, Insert 1 gm per vagina daily at bedtime for 2 weeks, then twice weekly thereafter, Disp: 30 g, Rfl: 3    Allergies:   Allergies   Allergen Reactions    Sulfa Antibiotics Swelling     Facial and eye swelling                Objective     Physical Exam:  Vital Signs:   Vitals:    24 0948   BP: 122/89   Pulse: 80   Temp: 97.2 °F (36.2 °C)   SpO2: 97%   Weight: 76.2 kg (168 lb)   Height: 154.9 cm (61\")     Body mass index is 31.74 kg/m².   BMI is >= 30 and <35. (Class 1 Obesity). The following options were offered after discussion;: " exercise counseling/recommendations and nutrition counseling/recommendations       Physical Exam  HENT:      Right Ear: Tympanic membrane normal.      Left Ear: Tympanic membrane normal.      Mouth/Throat:      Mouth: Mucous membranes are moist.   Eyes:      Conjunctiva/sclera: Conjunctivae normal.   Neck:      Thyroid: No thyroid tenderness.      Vascular: No carotid bruit.   Cardiovascular:      Rate and Rhythm: Normal rate and regular rhythm.      Heart sounds: Normal heart sounds. No murmur heard.  Pulmonary:      Breath sounds: Normal breath sounds.   Abdominal:      General: Bowel sounds are normal.      Palpations: Abdomen is soft.   Musculoskeletal:      Right lower leg: No edema.      Left lower leg: No edema.   Skin:     General: Skin is warm and dry.   Neurological:      Mental Status: She is alert.   Psychiatric:         Mood and Affect: Mood normal.         Behavior: Behavior normal.             Assessment / Plan      Assessment/Plan:   Diagnoses and all orders for this visit:    1. Mixed hyperlipidemia (Primary)    2. Impaired fasting glucose    3. Elevated liver enzymes    4. Renal insufficiency    5. Seasonal allergies    6. Anxiety    7. Current mild episode of major depressive disorder without prior episode    8. Mixed stress and urge urinary incontinence    9. Screening mammogram for breast cancer  -     Mammo Screening Digital Tomosynthesis Bilateral With CAD; Future    Other orders  -     cetirizine (zyrTEC) 10 MG tablet; Take 1 tablet by mouth every night at bedtime.  Dispense: 90 tablet; Refill: 1  -     fluticasone (FLONASE) 50 MCG/ACT nasal spray; 2 sprays into the nostril(s) as directed by provider Daily.  Dispense: 16 g; Refill: 5  -     venlafaxine XR (Effexor XR) 75 MG 24 hr capsule; Take 1 capsule by mouth Daily.  Dispense: 90 capsule; Refill: 1  -     Estrogens Conjugated (Premarin) 0.625 MG/GM vaginal cream; Insert 1 gm per vagina daily at bedtime for 2 weeks, then twice weekly  "thereafter  Dispense: 30 g; Refill: 3         Hyperlipidemia will obtain lipid panel to monitor  Peer pressure glucose obtain hemoglobin A1c to monitor  Elevated liver enzymes will obtain CMP to monitor do recommend avoid alcohol and Tylenol  Renal insufficiency will obtain CMP to monitor do recommend avoiding all NSAIDs  Seasonal allergies currently controlled Flonase Zyrtec will provide refills  Anxiety and depression well-controlled with Effexor 75 mg daily will provide a refill  Mixed stress and urge incontinence will start Premarin vaginal cream follow-up in 90 days to monitor Pap smear completed at that time patient is aware she is past due for Pap smear      Follow Up:   Return in about 3 months (around 3/3/2025).    Melinda Torres, RODGER    \"Please note that portions of this note were completed with a voice recognition program.\"    "

## 2024-12-03 ENCOUNTER — OFFICE VISIT (OUTPATIENT)
Dept: FAMILY MEDICINE CLINIC | Facility: CLINIC | Age: 59
End: 2024-12-03
Payer: COMMERCIAL

## 2024-12-03 VITALS
TEMPERATURE: 97.2 F | SYSTOLIC BLOOD PRESSURE: 122 MMHG | HEART RATE: 80 BPM | HEIGHT: 61 IN | WEIGHT: 168 LBS | DIASTOLIC BLOOD PRESSURE: 89 MMHG | BODY MASS INDEX: 31.72 KG/M2 | OXYGEN SATURATION: 97 %

## 2024-12-03 DIAGNOSIS — N39.46 MIXED STRESS AND URGE URINARY INCONTINENCE: ICD-10-CM

## 2024-12-03 DIAGNOSIS — N28.9 RENAL INSUFFICIENCY: ICD-10-CM

## 2024-12-03 DIAGNOSIS — R73.01 IMPAIRED FASTING GLUCOSE: ICD-10-CM

## 2024-12-03 DIAGNOSIS — E78.2 MIXED HYPERLIPIDEMIA: Primary | ICD-10-CM

## 2024-12-03 DIAGNOSIS — F41.9 ANXIETY: ICD-10-CM

## 2024-12-03 DIAGNOSIS — Z12.31 SCREENING MAMMOGRAM FOR BREAST CANCER: ICD-10-CM

## 2024-12-03 DIAGNOSIS — R74.8 ELEVATED LIVER ENZYMES: ICD-10-CM

## 2024-12-03 DIAGNOSIS — F32.0 CURRENT MILD EPISODE OF MAJOR DEPRESSIVE DISORDER WITHOUT PRIOR EPISODE: ICD-10-CM

## 2024-12-03 DIAGNOSIS — J30.2 SEASONAL ALLERGIES: ICD-10-CM

## 2024-12-03 PROCEDURE — 99214 OFFICE O/P EST MOD 30 MIN: CPT | Performed by: NURSE PRACTITIONER

## 2024-12-03 RX ORDER — CONJUGATED ESTROGENS 0.62 MG/G
CREAM VAGINAL
Qty: 30 G | Refills: 3 | Status: SHIPPED | OUTPATIENT
Start: 2024-12-03

## 2024-12-03 RX ORDER — FLUTICASONE PROPIONATE 50 MCG
2 SPRAY, SUSPENSION (ML) NASAL DAILY
Qty: 16 G | Refills: 5 | Status: SHIPPED | OUTPATIENT
Start: 2024-12-03

## 2024-12-03 RX ORDER — CETIRIZINE HYDROCHLORIDE 10 MG/1
10 TABLET ORAL
Qty: 90 TABLET | Refills: 1 | Status: SHIPPED | OUTPATIENT
Start: 2024-12-03

## 2024-12-03 RX ORDER — VENLAFAXINE HYDROCHLORIDE 75 MG/1
75 CAPSULE, EXTENDED RELEASE ORAL DAILY
Qty: 90 CAPSULE | Refills: 1 | Status: SHIPPED | OUTPATIENT
Start: 2024-12-03

## 2025-02-04 ENCOUNTER — LAB (OUTPATIENT)
Facility: HOSPITAL | Age: 60
End: 2025-02-04
Payer: COMMERCIAL

## 2025-02-04 LAB
ALBUMIN SERPL-MCNC: 4.1 G/DL (ref 3.5–5.2)
ALBUMIN/GLOB SERPL: 1.2 G/DL
ALP SERPL-CCNC: 59 U/L (ref 39–117)
ALT SERPL W P-5'-P-CCNC: 49 U/L (ref 1–33)
ANION GAP SERPL CALCULATED.3IONS-SCNC: 11.8 MMOL/L (ref 5–15)
AST SERPL-CCNC: 32 U/L (ref 1–32)
BILIRUB SERPL-MCNC: 0.6 MG/DL (ref 0–1.2)
BUN SERPL-MCNC: 9 MG/DL (ref 6–20)
BUN/CREAT SERPL: 8.8 (ref 7–25)
CALCIUM SPEC-SCNC: 9.7 MG/DL (ref 8.6–10.5)
CHLORIDE SERPL-SCNC: 103 MMOL/L (ref 98–107)
CHOLEST SERPL-MCNC: 240 MG/DL (ref 0–200)
CO2 SERPL-SCNC: 26.2 MMOL/L (ref 22–29)
CREAT SERPL-MCNC: 1.02 MG/DL (ref 0.57–1)
EGFRCR SERPLBLD CKD-EPI 2021: 64.3 ML/MIN/1.73
GLOBULIN UR ELPH-MCNC: 3.3 GM/DL
GLUCOSE SERPL-MCNC: 110 MG/DL (ref 65–99)
HBA1C MFR BLD: 5.6 % (ref 4.8–5.6)
HDLC SERPL-MCNC: 56 MG/DL (ref 40–60)
LDLC SERPL CALC-MCNC: 157 MG/DL (ref 0–100)
LDLC/HDLC SERPL: 2.75 {RATIO}
POTASSIUM SERPL-SCNC: 4 MMOL/L (ref 3.5–5.2)
PROT SERPL-MCNC: 7.4 G/DL (ref 6–8.5)
SODIUM SERPL-SCNC: 141 MMOL/L (ref 136–145)
TRIGL SERPL-MCNC: 150 MG/DL (ref 0–150)
VLDLC SERPL-MCNC: 27 MG/DL (ref 5–40)

## 2025-02-04 PROCEDURE — 80061 LIPID PANEL: CPT | Performed by: NURSE PRACTITIONER

## 2025-02-04 PROCEDURE — 83036 HEMOGLOBIN GLYCOSYLATED A1C: CPT | Performed by: NURSE PRACTITIONER

## 2025-02-04 PROCEDURE — 80053 COMPREHEN METABOLIC PANEL: CPT | Performed by: NURSE PRACTITIONER

## 2025-03-11 DIAGNOSIS — R32 INCONTINENCE OF URINE IN FEMALE: Primary | ICD-10-CM

## 2025-03-14 ENCOUNTER — HOSPITAL ENCOUNTER (OUTPATIENT)
Dept: MAMMOGRAPHY | Facility: HOSPITAL | Age: 60
Discharge: HOME | End: 2025-03-14
Admitting: NURSE PRACTITIONER
Payer: COMMERCIAL

## 2025-03-14 DIAGNOSIS — Z12.31 SCREENING MAMMOGRAM FOR BREAST CANCER: ICD-10-CM

## 2025-03-14 PROCEDURE — 77067 SCR MAMMO BI INCL CAD: CPT

## 2025-03-14 PROCEDURE — 77063 BREAST TOMOSYNTHESIS BI: CPT

## 2025-03-17 ENCOUNTER — RESULTS FOLLOW-UP (OUTPATIENT)
Dept: MAMMOGRAPHY | Facility: HOSPITAL | Age: 60
End: 2025-03-17
Payer: COMMERCIAL

## 2025-03-18 ENCOUNTER — OFFICE VISIT (OUTPATIENT)
Dept: UROLOGY | Age: 60
End: 2025-03-18
Payer: COMMERCIAL

## 2025-03-18 VITALS — HEIGHT: 61 IN | WEIGHT: 168 LBS | BODY MASS INDEX: 31.72 KG/M2 | RESPIRATION RATE: 16 BRPM

## 2025-03-18 DIAGNOSIS — N39.42 INCONTINENCE WITHOUT SENSORY AWARENESS: ICD-10-CM

## 2025-03-18 DIAGNOSIS — N32.81 OVERACTIVE BLADDER: ICD-10-CM

## 2025-03-18 DIAGNOSIS — R32 URINARY INCONTINENCE, UNSPECIFIED TYPE: Primary | ICD-10-CM

## 2025-03-18 LAB — URINE VOLUME: 0

## 2025-03-18 RX ORDER — OXYBUTYNIN CHLORIDE 5 MG/1
5 TABLET, EXTENDED RELEASE ORAL DAILY
Qty: 30 TABLET | Refills: 2 | Status: SHIPPED | OUTPATIENT
Start: 2025-03-18

## 2025-03-18 NOTE — TELEPHONE ENCOUNTER
"Relay     \"Findings:    No suspicious masses, suspicious microcalcifications, or architectural  distortions are identified.     IMPRESSION:  No mammographic evidence of malignancy.  Recommend annual screening  mammography.     BI-RADS ASSESSMENT: BI-RADS 1. Negative.\"          "

## 2025-03-18 NOTE — PROGRESS NOTES
Chief Complaint: Urinary Incontinence    Subjective         History of Present Illness  Luis Masterson is a 60 y.o. female presents to [facility] to be seen for incontinence.    History of Present Illness  The patient is a 60-year-old female who presents for evaluation of incontinence.    She underwent surgery for squamous cell carcinoma approximately 3 years ago, which was more extensive than initially anticipated. Postoperatively, she experienced increased urinary frequency, a symptom that has progressively worsened over the past 3 years. She reports persistent urinary urgency and incontinence during coughing, laughing, sneezing, and even when changing positions in bed at night. She also experiences episodes of incontinence without prior awareness. Her sleep is disrupted due to the need to urinate every few hours.     She has no history of glaucoma and has not previously been treated with medication for overactive bladder.     She has no cardiac or pulmonary issues and is not on any anticoagulant therapy.     She has no family history of bladder or kidney cancer.     She continues to use vaginal estrogen cream but notes that it is often expelled within 2 hours of application.     She has no history of glaucoma.      She has a past medical history of kidney stones. She reports no hematuria and has no history of other bladder or kidney surgeries.    SOCIAL HISTORY  She does not smoke.    FAMILY HISTORY  She has no family history of bladder or kidney cancers.    MEDICATIONS  Current: vaginal estrogen cream        Objective     Past Medical History:   Diagnosis Date    CTS (carpal tunnel syndrome)     Kidney stones     Night sweats     PONV (postoperative nausea and vomiting)     scope works well per pt.    Swelling of knee joint, left 08/02/2022    Tear of meniscus of knee     L KNEE    Urinary incontinence     Vaginal cancer        Past Surgical History:   Procedure Laterality Date    CARPAL  TUNNEL RELEASE      CHOLECYSTECTOMY      ENDOMETRIAL ABLATION      HAND SURGERY      HYSTERECTOMY      KIDNEY STONE SURGERY      KNEE ARTHROSCOPY W/ MENISCECTOMY Left 2022    Procedure: KNEE ARTHROSCOPY WITH PARTIAL MEDIAL MENISCECTOMY, POSSIBLE CHONDROPLASTY;  Surgeon: Lloyd Robles MD;  Location: [facility] [place] [facility] [place];  Service: Orthopedics;  Laterality: Left;    KNEE SURGERY  22    NECK SURGERY      THORACIC OUTLET SURGERY           Current Outpatient Medications:     cetirizine (zyrTEC) 10 MG tablet, Take 1 tablet by mouth every night at bedtime., Disp: 90 tablet, Rfl: 1    Estrogens Conjugated (Premarin) 0.625 MG/GM vaginal cream, Insert 1 gm per vagina daily at bedtime for 2 weeks, then twice weekly thereafter, Disp: 30 g, Rfl: 3    fluticasone (FLONASE) 50 MCG/ACT nasal spray, 2 sprays into the nostril(s) as directed by provider Daily., Disp: 16 g, Rfl: 5    valACYclovir (Valtrex) 1000 MG tablet, Take 1 tablet by mouth 2 (Two) Times a Day. (Patient taking differently: Take 1 tablet by mouth As Needed.), Disp: 60 tablet, Rfl: 2    venlafaxine XR (Effexor XR) 75 MG 24 hr capsule, Take 1 capsule by mouth Daily., Disp: 90 capsule, Rfl: 1    oxybutynin XL (DITROPAN-XL) 5 MG 24 hr tablet, Take 1 tablet by mouth Daily., Disp: 30 tablet, Rfl: 2    Allergies   Allergen Reactions    Sulfa Antibiotics Swelling     Facial and eye swelling         Family History   Problem Relation Age of Onset    Breast cancer Other     Stroke Other     Heart disease Other     Diabetes Other     Cancer Mother     Diabetes Mother     Osteoporosis Mother     Malig Hyperthermia Neg Hx        Social History     Socioeconomic History    Marital status:    Tobacco Use    Smoking status: Former     Current packs/day: 0.00     Average packs/day: 0.5 packs/day for 15.0 years (7.5 ttl pk-yrs)     Types: Cigarettes     Start date: 1996     Quit date: 2011     Years since quittin.2     Passive exposure: Never     "Smokeless tobacco: Never   Vaping Use    Vaping status: Never Used   Substance and Sexual Activity    Alcohol use: Never    Drug use: Never    Sexual activity: Defer       Vital Signs:   Resp 16   Ht 154.9 cm (61\")   Wt 76.2 kg (168 lb)   BMI 31.74 kg/m²      Physical Exam  Vitals reviewed.   Constitutional:       Appearance: Normal appearance.   Neurological:      General: No focal deficit present.      Mental Status: She is alert and oriented to person, place, and time.   Psychiatric:         Mood and Affect: Mood normal.         Behavior: Behavior normal.          Result Review :   The following data was reviewed by: RODGER Chavarria on 03/18/2025:      Bladder Scan interpretation 03/18/2025    Estimation of residual urine via MyEveTab 3000 VerSeiratherm Bladder Scan  MA/nurse performing: Sydney PENNINGTON,  Residual Urine: 0 ml  Indication: Urinary incontinence, unspecified type    Overactive bladder    Incontinence without sensory awareness   Position: Supine  Examination: Incremental scanning of the suprapubic area using 2.0 MHz transducer using copious amounts of acoustic gel.   Findings: An anechoic area was demonstrated which represented the bladder, with measurement of residual urine as noted. I inspected this myself. In that the residual urine was stable or insignificant, refer to plan for treatment and plan necessary at this time.            Results        Procedures        Assessment and Plan    Diagnoses and all orders for this visit:    1. Urinary incontinence, unspecified type (Primary)  -     Bladder Scan    2. Overactive bladder  -     oxybutynin XL (DITROPAN-XL) 5 MG 24 hr tablet; Take 1 tablet by mouth Daily.  Dispense: 30 tablet; Refill: 2    3. Incontinence without sensory awareness  -     Cystoscopy; Future        Assessment & Plan  1. Overactive bladder with urge incontinence.  Her symptoms suggest a combination of overactive bladder with urge incontinence and stress incontinence. Oxybutynin " once daily has been prescribed to manage the overactive bladder, with the understanding that it may take up to 6 weeks to observe the full effect. Potential side effects, including dry mouth, dry eyes, and constipation, have been discussed. She is advised to maintain adequate hydration. A referral to Dr. Sosa, has been made for a cystoscopy to ensure bladder health. If there is no improvement with the current medication, an alternative medication will be considered.    2. Stress incontinence.  She is advised to continue using the vaginal estrogen cream, which helps prevent infections and strengthens the pelvic area. If the stress incontinence persists despite improvement in frequency and urgency, procedural options such as Bulkamid can be discussed with Dr. Sosa.    Follow-up  The patient will follow up in 6 to 8 weeks.    PROCEDURE  The patient underwent surgery for squamous cell carcinoma approximately 3 years ago, which was more extensive than initially anticipated. She has a past medical history of kidney stones, which were managed with both surgical intervention and lithotripsy.      Follow Up   Return in about 6 weeks (around 4/29/2025) for Cystoscopy with Ian.  Patient was given instructions and counseling regarding her condition or for health maintenance advice. Please see specific information pulled into the AVS if appropriate.         This document has been electronically signed by RODGER Chavarria  March 18, 2025 14:53 EDT     Patient or patient representative verbalized consent for the use of Ambient Listening during the visit with  RODGER Chavarria for chart documentation. 3/18/2025  14:54 EDT

## 2025-04-07 DIAGNOSIS — E78.2 MIXED HYPERLIPIDEMIA: ICD-10-CM

## 2025-04-07 DIAGNOSIS — R74.8 ELEVATED LIVER ENZYMES: ICD-10-CM

## 2025-04-07 DIAGNOSIS — N28.9 RENAL INSUFFICIENCY: ICD-10-CM

## 2025-04-07 DIAGNOSIS — Z13.29 SCREENING FOR THYROID DISORDER: Primary | ICD-10-CM

## 2025-04-09 ENCOUNTER — LAB (OUTPATIENT)
Facility: HOSPITAL | Age: 60
End: 2025-04-09
Payer: COMMERCIAL

## 2025-04-09 ENCOUNTER — RESULTS FOLLOW-UP (OUTPATIENT)
Dept: FAMILY MEDICINE CLINIC | Facility: CLINIC | Age: 60
End: 2025-04-09
Payer: COMMERCIAL

## 2025-04-09 DIAGNOSIS — E03.9 HYPOTHYROIDISM, UNSPECIFIED TYPE: Primary | ICD-10-CM

## 2025-04-09 LAB
ALBUMIN SERPL-MCNC: 4.3 G/DL (ref 3.5–5.2)
ALBUMIN/GLOB SERPL: 1.6 G/DL
ALP SERPL-CCNC: 70 U/L (ref 39–117)
ALT SERPL W P-5'-P-CCNC: 58 U/L (ref 1–33)
ANION GAP SERPL CALCULATED.3IONS-SCNC: 8.3 MMOL/L (ref 5–15)
AST SERPL-CCNC: 41 U/L (ref 1–32)
BILIRUB SERPL-MCNC: 0.4 MG/DL (ref 0–1.2)
BUN SERPL-MCNC: 9 MG/DL (ref 6–20)
BUN/CREAT SERPL: 8.9 (ref 7–25)
CALCIUM SPEC-SCNC: 9.6 MG/DL (ref 8.6–10.5)
CHLORIDE SERPL-SCNC: 104 MMOL/L (ref 98–107)
CHOLEST SERPL-MCNC: 237 MG/DL (ref 0–200)
CO2 SERPL-SCNC: 27.7 MMOL/L (ref 22–29)
CREAT SERPL-MCNC: 1.01 MG/DL (ref 0.57–1)
EGFRCR SERPLBLD CKD-EPI 2021: 64.7 ML/MIN/1.73
GLOBULIN UR ELPH-MCNC: 2.7 GM/DL
GLUCOSE SERPL-MCNC: 99 MG/DL (ref 65–99)
HDLC SERPL-MCNC: 48 MG/DL (ref 40–60)
LDLC SERPL CALC-MCNC: 156 MG/DL (ref 0–100)
LDLC/HDLC SERPL: 3.19 {RATIO}
POTASSIUM SERPL-SCNC: 3.9 MMOL/L (ref 3.5–5.2)
PROT SERPL-MCNC: 7 G/DL (ref 6–8.5)
SODIUM SERPL-SCNC: 140 MMOL/L (ref 136–145)
T3FREE SERPL-MCNC: 3.77 PG/ML (ref 2–4.4)
T4 FREE SERPL-MCNC: 1.12 NG/DL (ref 0.92–1.68)
TRIGL SERPL-MCNC: 179 MG/DL (ref 0–150)
TSH SERPL DL<=0.05 MIU/L-ACNC: 5.75 UIU/ML (ref 0.27–4.2)
VLDLC SERPL-MCNC: 33 MG/DL (ref 5–40)

## 2025-04-09 PROCEDURE — 84439 ASSAY OF FREE THYROXINE: CPT | Performed by: NURSE PRACTITIONER

## 2025-04-09 PROCEDURE — 84445 ASSAY OF TSI GLOBULIN: CPT | Performed by: NURSE PRACTITIONER

## 2025-04-09 PROCEDURE — 80053 COMPREHEN METABOLIC PANEL: CPT | Performed by: NURSE PRACTITIONER

## 2025-04-09 PROCEDURE — 84443 ASSAY THYROID STIM HORMONE: CPT | Performed by: NURSE PRACTITIONER

## 2025-04-09 PROCEDURE — 80061 LIPID PANEL: CPT | Performed by: NURSE PRACTITIONER

## 2025-04-09 PROCEDURE — 84481 FREE ASSAY (FT-3): CPT | Performed by: NURSE PRACTITIONER

## 2025-04-09 PROCEDURE — 86376 MICROSOMAL ANTIBODY EACH: CPT | Performed by: NURSE PRACTITIONER

## 2025-04-09 NOTE — TELEPHONE ENCOUNTER
Hub staff attempted to follow warm transfer process and was unsuccessful          Caller: Luis Masterson    Relationship to patient: Self    Best call back number: [phone]    PATIENT RETURNING PHONE CALL TO OFFICE

## 2025-04-10 ENCOUNTER — TELEPHONE (OUTPATIENT)
Dept: UROLOGY | Age: 60
End: 2025-04-10
Payer: COMMERCIAL

## 2025-04-10 LAB — THYROPEROXIDASE AB SERPL-ACNC: 20 IU/ML (ref 0–34)

## 2025-04-10 NOTE — TELEPHONE ENCOUNTER
[facility] WILL BE OUT OF OFFICE ON 4/29    CALLED PT TO R/S APPT ON 4/29 ISAMAR ZAMORA.    NO ANSWER, LMOM / CX'D APPT    IF PT CALLS BACK, OK FOR HUB TO RELAY MESSAGE AND R/S FOR NEXT AVAILABLE ISAMAR ZAMORA.

## 2025-04-11 LAB — TSI SER-ACNC: 8.89 IU/L (ref 0–0.55)

## 2025-04-11 NOTE — TELEPHONE ENCOUNTER
2ND CALL - NOAH WILL BE OUT OF OFFICE ON 4/29    CALLED PT TO R/S APPT ON 4/29 LINDA/ [facility].    NO ANSWER, LMOM / CX'D APPT    IF PT CALLS BACK, OK FOR HUB TO RELAY MESSAGE AND R/S FOR NEXT AVAILABLE LINDA/ [facility].

## 2025-04-29 NOTE — PROGRESS NOTES
Female Physical / PAP Note      Patient Name: Luis Masterson  : 1965   MRN: [id number]     Chief Complaint:  gynecological exam       History of Present Illness: Luis Masterson is a 60 y.o. female who is here today for her annual health maintenance and physical.    follow up for IFG, hyperlipidemia, anxiety, allergies, evaded liver enzymes and renal insufficiency   review lab results     Patient feeling tired all of the time feel this is related to hypothyroidism     mammogram- 3/2025  Pap-10/2021  Labs- 2025  Cologuard 2023     Walking 1-1.5 miles per day  Cut out soda  Eating healthier     Patient complaining of uncontrolled left knee pain has had multiple injections and surgery would like a second opinion from orthopedic surgery outside of the [facility]    Subjective      Review of Systems:   Review of Systems   Constitutional:  Positive for fatigue.   Respiratory:  Negative for cough.    Cardiovascular:  Negative for chest pain.   Gastrointestinal:  Negative for abdominal pain.   Genitourinary:  Negative for dysuria.   Musculoskeletal:  Negative for myalgias.      Breast - No tenderness, lumps, discharge, or blood from nipples.      Past Medical History, Social History, Family History and Care Team were all reviewed with patient and updated as appropriate.     Medications:     Current Outpatient Medications:     cetirizine (zyrTEC) 10 MG tablet, Take 1 tablet by mouth every night at bedtime., Disp: 90 tablet, Rfl: 1    Estrogens Conjugated (Premarin) 0.625 MG/GM vaginal cream, Insert 1 gm per vagina daily at bedtime for 2 weeks, then twice weekly thereafter, Disp: 30 g, Rfl: 3    fluticasone (FLONASE) 50 MCG/ACT nasal spray, 2 sprays into the nostril(s) as directed by provider Daily., Disp: 16 g, Rfl: 5    oxybutynin XL (DITROPAN-XL) 5 MG 24 hr tablet, Take 1 tablet by mouth Daily., Disp: 90 tablet, Rfl: 1    valACYclovir (Valtrex) 1000 MG tablet, Take 1 tablet by mouth 2 (Two) Times a Day.  "(Patient taking differently: Take 1 tablet by mouth As Needed.), Disp: 60 tablet, Rfl: 2    venlafaxine XR (Effexor XR) 75 MG 24 hr capsule, Take 1 capsule by mouth Daily., Disp: 90 capsule, Rfl: 1    levothyroxine (SYNTHROID, LEVOTHROID) 25 MCG tablet, Take 1 tablet by mouth Every Morning., Disp: 30 tablet, Rfl: 2    Allergies:   Allergies   Allergen Reactions    Sulfa Antibiotics Swelling     Facial and eye swelling                Objective     Physical Exam:  Vital Signs:   Vitals:    05/01/25 0816 05/01/25 0911   BP: 153/84 142/82   Pulse: 67    Temp: 97.4 °F (36.3 °C)    SpO2: 98%    Weight: 82.4 kg (181 lb 9.6 oz)    Height: 154.9 cm (61\")    PainSc: 0-No pain      Body mass index is 34.31 kg/m².           Physical Exam  Eyes:      Conjunctiva/sclera: Conjunctivae normal.   Neck:      Thyroid: No thyroid tenderness.      Vascular: No carotid bruit.   Cardiovascular:      Rate and Rhythm: Normal rate and regular rhythm.      Heart sounds: Normal heart sounds. No murmur heard.  Pulmonary:      Effort: Pulmonary effort is normal.      Breath sounds: Normal breath sounds.   Chest:   Breasts:     Right: Normal.      Left: Normal.   Abdominal:      General: Bowel sounds are normal.      Palpations: Abdomen is soft.   Genitourinary:     General: Normal vulva.      Vagina: Vaginal discharge present.      Cervix: Normal.      Uterus: Normal.       Adnexa: Right adnexa normal and left adnexa normal.      Rectum: Normal.   Musculoskeletal:      Right lower leg: No edema.      Left lower leg: No edema.      Comments: Crepitus left knee mild swelling or erythema   Lymphadenopathy:      Upper Body:      Right upper body: No supraclavicular, axillary or pectoral adenopathy.      Left upper body: No supraclavicular, axillary or pectoral adenopathy.   Skin:     General: Skin is warm and dry.   Neurological:      Mental Status: She is alert.   Psychiatric:         Mood and Affect: Mood normal.         Behavior: Behavior normal. "           Assessment / Plan      Assessment/Plan:   Diagnoses and all orders for this visit:    1. Encounter for well woman exam with routine gynecological exam (Primary)    2. Screening for malignant neoplasm of cervix  -     IgP, Aptima HPV    3. Acquired hypothyroidism  -     TSH; Future  -     T4, Free; Future    4. Mixed hyperlipidemia    5. Impaired fasting glucose    6. Class 1 obesity due to excess calories without serious comorbidity with body mass index (BMI) of 34.0 to 34.9 in adult    7. Seasonal allergies    8. Renal insufficiency    9. Overactive bladder  -     oxybutynin XL (DITROPAN-XL) 5 MG 24 hr tablet; Take 1 tablet by mouth Daily.  Dispense: 90 tablet; Refill: 1    10. Elevated blood pressure reading    11. Mixed stress and urge urinary incontinence    12. Medial meniscus, posterior horn derangement, left  -     Ambulatory Referral to Orthopedic Surgery    13. Meniscus, lateral, derangement, left  -     Ambulatory Referral to Orthopedic Surgery    14. Acute pain of left knee  -     Ambulatory Referral to Orthopedic Surgery    15. Vaginal discharge  -     Gardnerella vaginalis, Trichomonas vaginalis, Candida albicans, DNA - Swab, Vagina; Future  -     Gardnerella vaginalis, Trichomonas vaginalis, Candida albicans, DNA - Swab, Vagina    Other orders  -     levothyroxine (SYNTHROID, LEVOTHROID) 25 MCG tablet; Take 1 tablet by mouth Every Morning.  Dispense: 30 tablet; Refill: 2  -     Estrogens Conjugated (Premarin) 0.625 MG/GM vaginal cream; Insert 1 gm per vagina daily at bedtime for 2 weeks, then twice weekly thereafter  Dispense: 30 g; Refill: 3    Encounter for well woman exam pelvic exam completed Pap smear collected to screen for cervical cancer  Vaginal discharge vaginal screen collected for further evaluation  Hypothyroidism labs reviewed will start Synthroid 25 mcg daily reassess lab in 6 weeks to monitor  Hyperlipidemia recommend cutting out fried foods pork products red meat increasing  "fruits vegetables whole grains increasing fiber in diet such as added Metamucil once daily reviewed ASCVD risk score with patient  Impaired fasting glucose will obtain hemoglobin A1c to monitor recommend to Skylar dewitt sugars exercise 30 minutes daily weight loss  Class I obesity patient's insurance no longer covers weight loss medication directed reducing overall caloric intake and exercise 30 minutes daily  Seasonal allergies currently controlled with Flonase and Zyrtec  Renal insufficiency recommend increase water intake avoid all NSAIDs and weight loss  Overactive bladder symptoms uncontrolled with Ditropan XL and Premarin patient is scheduled for urology consult and possible cystoscopy  Left knee pain ongoing conservative measures have been completed patient would like a second opinion referral placed    The 10-year ASCVD risk score (Chilango RAO, et al., 2019) is: 4.2%    Values used to calculate the score:      Age: 60 years      Sex: Female      Is Non- : No      Diabetic: No      Tobacco smoker: No      Systolic Blood Pressure: 142 mmHg      Is BP treated: No      HDL Cholesterol: 48 mg/dL      Total Cholesterol: 237 mg/dL        Follow Up:   Return in about 6 months (around 11/1/2025).    Healthcare Maintenance:   Counseling provided on screening mammogram, screening pap, screening colon cancer, diet and exercise   Luis Masterson voices understanding and acceptance of this advice and will call back with any further questions or concerns. AVS with preventive healthcare tips printed for patient.     Nasima Torres, RODGER    \"Please note that portions of this note were completed with a voice recognition program.\"     "

## 2025-05-01 ENCOUNTER — OFFICE VISIT (OUTPATIENT)
Dept: FAMILY MEDICINE CLINIC | Facility: CLINIC | Age: 60
End: 2025-05-01
Payer: COMMERCIAL

## 2025-05-01 ENCOUNTER — LAB (OUTPATIENT)
Facility: HOSPITAL | Age: 60
End: 2025-05-01
Payer: COMMERCIAL

## 2025-05-01 VITALS
SYSTOLIC BLOOD PRESSURE: 142 MMHG | HEIGHT: 61 IN | WEIGHT: 181.6 LBS | HEART RATE: 67 BPM | OXYGEN SATURATION: 98 % | DIASTOLIC BLOOD PRESSURE: 82 MMHG | BODY MASS INDEX: 34.29 KG/M2 | TEMPERATURE: 97.4 F

## 2025-05-01 DIAGNOSIS — E78.2 MIXED HYPERLIPIDEMIA: ICD-10-CM

## 2025-05-01 DIAGNOSIS — N89.8 VAGINAL DISCHARGE: ICD-10-CM

## 2025-05-01 DIAGNOSIS — Z01.419 ENCOUNTER FOR WELL WOMAN EXAM WITH ROUTINE GYNECOLOGICAL EXAM: Primary | ICD-10-CM

## 2025-05-01 DIAGNOSIS — N28.9 RENAL INSUFFICIENCY: ICD-10-CM

## 2025-05-01 DIAGNOSIS — N32.81 OVERACTIVE BLADDER: ICD-10-CM

## 2025-05-01 DIAGNOSIS — R73.01 IMPAIRED FASTING GLUCOSE: ICD-10-CM

## 2025-05-01 DIAGNOSIS — M23.301 MENISCUS, LATERAL, DERANGEMENT, LEFT: ICD-10-CM

## 2025-05-01 DIAGNOSIS — E03.9 HYPOTHYROIDISM, UNSPECIFIED TYPE: ICD-10-CM

## 2025-05-01 DIAGNOSIS — E66.811 CLASS 1 OBESITY DUE TO EXCESS CALORIES WITHOUT SERIOUS COMORBIDITY WITH BODY MASS INDEX (BMI) OF 34.0 TO 34.9 IN ADULT: ICD-10-CM

## 2025-05-01 DIAGNOSIS — E66.09 CLASS 1 OBESITY DUE TO EXCESS CALORIES WITHOUT SERIOUS COMORBIDITY WITH BODY MASS INDEX (BMI) OF 34.0 TO 34.9 IN ADULT: ICD-10-CM

## 2025-05-01 DIAGNOSIS — M25.562 ACUTE PAIN OF LEFT KNEE: ICD-10-CM

## 2025-05-01 DIAGNOSIS — J30.2 SEASONAL ALLERGIES: ICD-10-CM

## 2025-05-01 DIAGNOSIS — M23.322 MEDIAL MENISCUS, POSTERIOR HORN DERANGEMENT, LEFT: ICD-10-CM

## 2025-05-01 DIAGNOSIS — E03.9 ACQUIRED HYPOTHYROIDISM: ICD-10-CM

## 2025-05-01 DIAGNOSIS — Z12.4 SCREENING FOR MALIGNANT NEOPLASM OF CERVIX: ICD-10-CM

## 2025-05-01 DIAGNOSIS — R03.0 ELEVATED BLOOD PRESSURE READING: ICD-10-CM

## 2025-05-01 DIAGNOSIS — N39.46 MIXED STRESS AND URGE URINARY INCONTINENCE: ICD-10-CM

## 2025-05-01 LAB
CANDIDA SPECIES: NEGATIVE
GARDNERELLA VAGINALIS: NEGATIVE
T VAGINALIS DNA VAG QL PROBE+SIG AMP: NEGATIVE
TSH SERPL DL<=0.05 MIU/L-ACNC: 4.35 UIU/ML (ref 0.27–4.2)

## 2025-05-01 PROCEDURE — 87660 TRICHOMONAS VAGIN DIR PROBE: CPT | Performed by: NURSE PRACTITIONER

## 2025-05-01 PROCEDURE — 87480 CANDIDA DNA DIR PROBE: CPT | Performed by: NURSE PRACTITIONER

## 2025-05-01 PROCEDURE — 87624 HPV HI-RISK TYP POOLED RSLT: CPT | Performed by: NURSE PRACTITIONER

## 2025-05-01 PROCEDURE — 36415 COLL VENOUS BLD VENIPUNCTURE: CPT

## 2025-05-01 PROCEDURE — 87510 GARDNER VAG DNA DIR PROBE: CPT | Performed by: NURSE PRACTITIONER

## 2025-05-01 PROCEDURE — G0123 SCREEN CERV/VAG THIN LAYER: HCPCS | Performed by: NURSE PRACTITIONER

## 2025-05-01 RX ORDER — CONJUGATED ESTROGENS 0.62 MG/G
CREAM VAGINAL
Qty: 30 G | Refills: 3 | Status: SHIPPED | OUTPATIENT
Start: 2025-05-01

## 2025-05-01 RX ORDER — OXYBUTYNIN CHLORIDE 5 MG/1
5 TABLET, EXTENDED RELEASE ORAL DAILY
Qty: 90 TABLET | Refills: 1 | Status: SHIPPED | OUTPATIENT
Start: 2025-05-01

## 2025-05-01 RX ORDER — LEVOTHYROXINE SODIUM 25 UG/1
25 TABLET ORAL
Qty: 30 TABLET | Refills: 2 | Status: SHIPPED | OUTPATIENT
Start: 2025-05-01

## 2025-05-04 LAB
CYTOLOGIST CVX/VAG CYTO: ABNORMAL
CYTOLOGY CVX/VAG DOC CYTO: ABNORMAL
CYTOLOGY CVX/VAG DOC THIN PREP: ABNORMAL
DX ICD CODE: ABNORMAL
HPV I/H RISK 4 DNA CVX QL PROBE+SIG AMP: POSITIVE
OTHER STN SPEC: ABNORMAL
SERVICE CMNT-IMP: ABNORMAL
STAT OF ADQ CVX/VAG CYTO-IMP: ABNORMAL

## 2025-05-15 ENCOUNTER — OFFICE VISIT (OUTPATIENT)
Dept: UROLOGY | Age: 60
End: 2025-05-15
Payer: COMMERCIAL

## 2025-05-15 VITALS — HEIGHT: 61 IN | RESPIRATION RATE: 14 BRPM | WEIGHT: 181.66 LBS | BODY MASS INDEX: 34.3 KG/M2

## 2025-05-15 DIAGNOSIS — R32 URINARY INCONTINENCE, UNSPECIFIED TYPE: Primary | ICD-10-CM

## 2025-05-15 DIAGNOSIS — N32.81 OVERACTIVE BLADDER: ICD-10-CM

## 2025-05-15 LAB — URINE VOLUME: 0

## 2025-05-15 RX ORDER — TOLTERODINE 2 MG/1
2 CAPSULE, EXTENDED RELEASE ORAL DAILY
Qty: 30 CAPSULE | Refills: 2 | Status: SHIPPED | OUTPATIENT
Start: 2025-05-15

## 2025-05-15 NOTE — PROGRESS NOTES
Chief Complaint: Urinary Incontinence    Subjective         History of Present Illness  Luis Masterson is a 60 y.o. female presents to [facility] to be seen for follow-up.    Patient was previously seen by me with last visit on 3/18/2025 for incontinence/overactive bladder/incontinence without sensory awareness.  We did start her on oxybutynin at that visit and she is scheduled for a cystoscopy with Dr. Sosa in August.  She is here for follow-up    History of Present Illness  The patient is a 60-year-old female who presents for follow-up of overactive bladder (OAB) and incontinence.    She reports that the oxybutynin treatment has not resulted in any noticeable improvement in her symptoms. She experiences frequent urges to urinate, particularly during her work hours, necessitating frequent bathroom visits. She expresses uncertainty about the efficacy of the medication. She also mentions an upcoming appointment with Dr. Sosa on 08/08/2025 for a cystoscopy. She recalls a previous discussion with her oncologist regarding the potential need for a bladder lift procedure following her cancer surgery. She expresses reluctance towards undergoing anesthesia again due to past experiences with knee surgery and other procedures. She reports no unusual sensations or bulges in the pelvic region.    MEDICATIONS  Discontinued: Oxybutynin      Previous 3/18/2025:  The patient is a 60-year-old female who presents for evaluation of incontinence.     She underwent surgery for squamous cell carcinoma approximately 3 years ago, which was more extensive than initially anticipated. Postoperatively, she experienced increased urinary frequency, a symptom that has progressively worsened over the past 3 years. She reports persistent urinary urgency and incontinence during coughing, laughing, sneezing, and even when changing positions in bed at night. She also experiences episodes of incontinence without prior  awareness. Her sleep is disrupted due to the need to urinate every few hours.      She has no history of glaucoma and has not previously been treated with medication for overactive bladder.      She has no cardiac or pulmonary issues and is not on any anticoagulant therapy.      She has no family history of bladder or kidney cancer.      She continues to use vaginal estrogen cream but notes that it is often expelled within 2 hours of application.      She has no history of glaucoma.        She has a past medical history of kidney stones. She reports no hematuria and has no history of other bladder or kidney surgeries.     SOCIAL HISTORY  She does not smoke.     FAMILY HISTORY  She has no family history of bladder or kidney cancers.     MEDICATIONS  Current: vaginal estrogen cream       Objective     Past Medical History:   Diagnosis Date    CTS (carpal tunnel syndrome)     Kidney stones     Night sweats     PONV (postoperative nausea and vomiting)     scope works well per pt.    Swelling of knee joint, left 08/02/2022    Tear of meniscus of knee     L KNEE    Urinary incontinence     Vaginal cancer        Past Surgical History:   Procedure Laterality Date    CARPAL TUNNEL RELEASE      CHOLECYSTECTOMY      ENDOMETRIAL ABLATION      HAND SURGERY      KIDNEY STONE SURGERY      KNEE ARTHROSCOPY W/ MENISCECTOMY Left 09/22/2022    Procedure: KNEE ARTHROSCOPY WITH PARTIAL MEDIAL MENISCECTOMY, POSSIBLE CHONDROPLASTY;  Surgeon: Lloyd Robles MD;  Location: [facility] [place];  Service: Orthopedics;  Laterality: Left;    KNEE SURGERY  09/22/22    NECK SURGERY      THORACIC OUTLET SURGERY           Current Outpatient Medications:     cetirizine (zyrTEC) 10 MG tablet, Take 1 tablet by mouth every night at bedtime., Disp: 90 tablet, Rfl: 1    Estrogens Conjugated (Premarin) 0.625 MG/GM vaginal cream, Insert 1 gm per vagina daily at bedtime for 2 weeks, then twice weekly thereafter, Disp: 30 g, Rfl: 3    fluticasone (FLONASE) 50  "MCG/ACT nasal spray, 2 sprays into the nostril(s) as directed by provider Daily., Disp: 16 g, Rfl: 5    levothyroxine (SYNTHROID, LEVOTHROID) 25 MCG tablet, Take 1 tablet by mouth Every Morning., Disp: 30 tablet, Rfl: 2    valACYclovir (Valtrex) 1000 MG tablet, Take 1 tablet by mouth 2 (Two) Times a Day. (Patient taking differently: Take 1 tablet by mouth As Needed.), Disp: 60 tablet, Rfl: 2    venlafaxine XR (Effexor XR) 75 MG 24 hr capsule, Take 1 capsule by mouth Daily., Disp: 90 capsule, Rfl: 1    tolterodine LA (DETROL LA) 2 MG 24 hr capsule, Take 1 capsule by mouth Daily., Disp: 30 capsule, Rfl: 2    Allergies   Allergen Reactions    Sulfa Antibiotics Swelling     Facial and eye swelling         Family History   Problem Relation Age of Onset    Breast cancer Other     Stroke Other     Heart disease Other     Diabetes Other     Cancer Mother     Diabetes Mother     Osteoporosis Mother     Malig Hyperthermia Neg Hx        Social History     Socioeconomic History    Marital status:    Tobacco Use    Smoking status: Former     Current packs/day: 0.00     Average packs/day: 0.5 packs/day for 15.0 years (7.5 ttl pk-yrs)     Types: Cigarettes     Start date: 1996     Quit date: 2011     Years since quittin.4     Passive exposure: Never    Smokeless tobacco: Never   Vaping Use    Vaping status: Never Used   Substance and Sexual Activity    Alcohol use: Never    Drug use: Never    Sexual activity: Defer       Vital Signs:   Resp 14   Ht 154.9 cm (61\")   Wt 82.4 kg (181 lb 10.5 oz)   BMI 34.32 kg/m²      Physical Exam  Vitals reviewed.   Constitutional:       Appearance: Normal appearance.   Genitourinary:     Urethra: No prolapse or urethral pain.      Comments: No bladder prolapse noted  Neurological:      General: No focal deficit present.      Mental Status: She is alert and oriented to person, place, and time.   Psychiatric:         Mood and Affect: Mood normal.         Behavior: Behavior " normal.          Result Review :   The following data was reviewed by: RODGER Chavarira on 05/15/2025:  Results for orders placed or performed in visit on 05/15/25   Bladder Scan    Collection Time: 05/15/25 10:33 AM   Result Value Ref Range    Urine Volume 0       Bladder Scan interpretation 05/15/2025    Estimation of residual urine via BVI 3000 [address] Bladder Scan  MA/nurse performing: Sydney PENNINGTON MA  Residual Urine: 0 ml  Indication: Urinary incontinence, unspecified type    Overactive bladder   Position: Supine  Examination: Incremental scanning of the suprapubic area using 2.0 MHz transducer using copious amounts of acoustic gel.   Findings: An anechoic area was demonstrated which represented the bladder, with measurement of residual urine as noted. I inspected this myself. In that the residual urine was stable or insignificant, refer to plan for treatment and plan necessary at this time.         Results        Procedures        Assessment and Plan    Diagnoses and all orders for this visit:    1. Urinary incontinence, unspecified type (Primary)  -     Bladder Scan  -     tolterodine LA (DETROL LA) 2 MG 24 hr capsule; Take 1 capsule by mouth Daily.  Dispense: 30 capsule; Refill: 2    2. Overactive bladder  -     tolterodine LA (DETROL LA) 2 MG 24 hr capsule; Take 1 capsule by mouth Daily.  Dispense: 30 capsule; Refill: 2        Assessment & Plan  1. Overactive bladder (OAB) and incontinence.  She reports no significant improvement with oxybutynin and experiences frequent urges to urinate, especially at work. A switch to tolterodine has been prescribed. A cystoscopy is scheduled with Dr. Sosa on 08/08/2025 to rule out any underlying bladder conditions, such as bladder cancer, that could be contributing to her incontinence without sensory awareness.     No prolapse noted     follow-up  The patient will follow up when she has her cystoscopy.      Follow Up   Return in about 8 weeks (around  7/10/2025).  Patient was given instructions and counseling regarding her condition or for health maintenance advice. Please see specific information pulled into the AVS if appropriate.         This document has been electronically signed by RODGER Chavarria  May 15, 2025 10:51 EDT     Patient or patient representative verbalized consent for the use of Ambient Listening during the visit with  RODGER Chavarria for chart documentation. 5/15/2025  11:03 EDT

## 2025-07-10 NOTE — PROGRESS NOTES
ACUTE VISIT     Patient Name: Luis Masterson  : 1965   MRN: [id number]     Chief Complaint:  skin tag removal    History of Present Illness: Luis Masterson is a 60 y.o. female who is here today for skin tag removal.  She has some under both her arms    One in right axilla  Four in left axilla     Also c/o aching in hands, using over-the-counter Voltaren gel would like a prescription    Follow-up new start Synthroid 25 mcg daily patient reports tolerating well   Subjective      Review of Systems:   Review of Systems   Constitutional:  Negative for fever.   Respiratory:  Negative for cough.    Cardiovascular:  Negative for chest pain and palpitations.   Musculoskeletal:  Positive for arthralgias. Negative for joint swelling.   Skin:  Negative for rash.        Skin tags        Past Medical History:   Past Medical History:   Diagnosis Date   • CTS (carpal tunnel syndrome)    • Kidney stones    • Night sweats    • PONV (postoperative nausea and vomiting)     scope works well per pt.   • Swelling of knee joint, left 2022   • Tear of meniscus of knee     L KNEE   • Urinary incontinence    • Vaginal cancer        Past Surgical History:   Past Surgical History:   Procedure Laterality Date   • CARPAL TUNNEL RELEASE     • CHOLECYSTECTOMY     • ENDOMETRIAL ABLATION     • HAND SURGERY     • KIDNEY STONE SURGERY     • KNEE ARTHROSCOPY W/ MENISCECTOMY Left 2022    Procedure: KNEE ARTHROSCOPY WITH PARTIAL MEDIAL MENISCECTOMY, POSSIBLE CHONDROPLASTY;  Surgeon: Lloyd Robles MD;  Location: [facility] [place] [place];  Service: Orthopedics;  Laterality: Left;   • KNEE SURGERY  22   • NECK SURGERY     • THORACIC OUTLET SURGERY         Family History:   Family History   Problem Relation Age of Onset   • Breast cancer Other    • Stroke Other    • Heart disease Other    • Diabetes Other    • Cancer Mother    • Diabetes Mother    • Osteoporosis Mother    • Malig Hyperthermia Neg Hx        Social History:   Social History  "    Socioeconomic History   • Marital status:    Tobacco Use   • Smoking status: Former     Current packs/day: 0.00     Average packs/day: 0.5 packs/day for 15.0 years (7.5 ttl pk-yrs)     Types: Cigarettes     Start date: 1996     Quit date: 2011     Years since quittin.5     Passive exposure: Never   • Smokeless tobacco: Never   Vaping Use   • Vaping status: Never Used   Substance and Sexual Activity   • Alcohol use: Never   • Drug use: Never   • Sexual activity: Defer       Medications:     Current Outpatient Medications:   •  cetirizine (zyrTEC) 10 MG tablet, Take 1 tablet by mouth every night at bedtime., Disp: 90 tablet, Rfl: 1  •  Estrogens Conjugated (Premarin) 0.625 MG/GM vaginal cream, Insert 1 gm per vagina daily at bedtime for 2 weeks, then twice weekly thereafter, Disp: 30 g, Rfl: 3  •  fluticasone (FLONASE) 50 MCG/ACT nasal spray, 2 sprays into the nostril(s) as directed by provider Daily., Disp: 16 g, Rfl: 5  •  levothyroxine (SYNTHROID, LEVOTHROID) 25 MCG tablet, Take 1 tablet by mouth Every Morning., Disp: 30 tablet, Rfl: 2  •  valACYclovir (Valtrex) 1000 MG tablet, Take 1 tablet by mouth 2 (Two) Times a Day. (Patient taking differently: Take 1 tablet by mouth As Needed.), Disp: 60 tablet, Rfl: 2  •  venlafaxine XR (Effexor XR) 75 MG 24 hr capsule, Take 1 capsule by mouth Daily., Disp: 90 capsule, Rfl: 1  •  Diclofenac Sodium (VOLTAREN) 1 % gel gel, Apply 4 g topically to the appropriate area as directed 4 (Four) Times a Day As Needed for joint pain., Disp: 400 g, Rfl: 3    Allergies:   Allergies   Allergen Reactions   • Sulfa Antibiotics Swelling     Facial and eye swelling          Objective     Physical Exam:  Vital Signs:   Vitals:    25 0821   BP: 114/74   Pulse: 73   Temp: 97.6 °F (36.4 °C)   SpO2: 97%   Weight: 81.2 kg (179 lb)   Height: 154.9 cm (61\")   PainSc: 2    PainLoc: Hand     Body mass index is 33.82 kg/m².     Physical Exam  Cardiovascular:      Rate " and Rhythm: Normal rate and regular rhythm.      Heart sounds: Normal heart sounds. No murmur heard.  Pulmonary:      Effort: Pulmonary effort is normal.      Breath sounds: Normal breath sounds.   Skin:     General: Skin is warm and dry.      Comments: Skin tags flesh tone, non tender, no surrounding erythema, no edema   Neurological:      Mental Status: She is alert.     Skin Tag Removal    Date/Time: 7/17/2025 8:34 AM    Performed by: Nasima Torres APRN  Authorized by: Nasima Torres APRN  Preparation: Patient was prepped and draped in the usual sterile fashion.  Local anesthesia used: no    Anesthesia:  Local anesthesia used: no    Sedation:  Patient sedated: no    Patient tolerance: patient tolerated the procedure well with no immediate complications         Assessment / Plan      Assessment/Plan:   Diagnoses and all orders for this visit:    1. Skin tag (Primary)  -     Skin Tag Removal    2. Acquired hypothyroidism  -     TSH  -     T4, Free    3. Bilateral hand pain    Other orders  -     Diclofenac Sodium (VOLTAREN) 1 % gel gel; Apply 4 g topically to the appropriate area as directed 4 (Four) Times a Day As Needed for joint pain.  Dispense: 400 g; Refill: 3       Skin tag removed patient tolerated well aftercare instructions discussed  Hypothyroidism denies palpitations will obtain labs to monitor will call with results and further recommendations  Bilateral hand pain will provide a prescription for diclofenac gel may also take OTC Tylenol arthritis      Follow Up:   Return if symptoms worsen or fail to improve.    RODGER Gill      Please note that portions of this note were completed with a voice recognition program.

## 2025-07-17 ENCOUNTER — PROCEDURE VISIT (OUTPATIENT)
Dept: FAMILY MEDICINE CLINIC | Facility: CLINIC | Age: 60
End: 2025-07-17
Payer: COMMERCIAL

## 2025-07-17 VITALS
SYSTOLIC BLOOD PRESSURE: 114 MMHG | HEIGHT: 61 IN | OXYGEN SATURATION: 97 % | WEIGHT: 179 LBS | BODY MASS INDEX: 33.79 KG/M2 | HEART RATE: 73 BPM | TEMPERATURE: 97.6 F | DIASTOLIC BLOOD PRESSURE: 74 MMHG

## 2025-07-17 DIAGNOSIS — M79.641 BILATERAL HAND PAIN: ICD-10-CM

## 2025-07-17 DIAGNOSIS — L91.8 SKIN TAG: Primary | ICD-10-CM

## 2025-07-17 DIAGNOSIS — E03.9 ACQUIRED HYPOTHYROIDISM: ICD-10-CM

## 2025-07-17 DIAGNOSIS — M79.642 BILATERAL HAND PAIN: ICD-10-CM

## 2025-07-17 LAB
T4 FREE SERPL-MCNC: 1.11 NG/DL (ref 0.92–1.68)
TSH SERPL DL<=0.05 MIU/L-ACNC: 2.44 UIU/ML (ref 0.27–4.2)

## 2025-07-17 PROCEDURE — 84443 ASSAY THYROID STIM HORMONE: CPT | Performed by: NURSE PRACTITIONER

## 2025-07-17 PROCEDURE — 84439 ASSAY OF FREE THYROXINE: CPT | Performed by: NURSE PRACTITIONER

## 2025-08-08 ENCOUNTER — OFFICE VISIT (OUTPATIENT)
Dept: ORTHOPEDIC SURGERY | Facility: CLINIC | Age: 60
End: 2025-08-08
Payer: COMMERCIAL

## 2025-08-08 VITALS
OXYGEN SATURATION: 96 % | BODY MASS INDEX: 35.12 KG/M2 | WEIGHT: 186 LBS | DIASTOLIC BLOOD PRESSURE: 83 MMHG | HEIGHT: 61 IN | HEART RATE: 74 BPM | SYSTOLIC BLOOD PRESSURE: 127 MMHG

## 2025-08-08 DIAGNOSIS — M65.341 TRIGGER FINGER, RIGHT RING FINGER: Primary | ICD-10-CM

## 2025-08-08 RX ORDER — TRIAMCINOLONE ACETONIDE 40 MG/ML
40 INJECTION, SUSPENSION INTRA-ARTICULAR; INTRAMUSCULAR
Status: COMPLETED | OUTPATIENT
Start: 2025-08-08 | End: 2025-08-08

## 2025-08-08 RX ORDER — LIDOCAINE HYDROCHLORIDE 10 MG/ML
1 INJECTION, SOLUTION INFILTRATION; PERINEURAL
Status: COMPLETED | OUTPATIENT
Start: 2025-08-08 | End: 2025-08-08

## 2025-08-08 RX ADMIN — TRIAMCINOLONE ACETONIDE 40 MG: 40 INJECTION, SUSPENSION INTRA-ARTICULAR; INTRAMUSCULAR at 14:45

## 2025-08-08 RX ADMIN — LIDOCAINE HYDROCHLORIDE 1 ML: 10 INJECTION, SOLUTION INFILTRATION; PERINEURAL at 14:45

## 2025-08-27 ENCOUNTER — TELEPHONE (OUTPATIENT)
Dept: UROLOGY | Age: 60
End: 2025-08-27
Payer: COMMERCIAL

## (undated) DEVICE — SUT ETHLN 3-0 FS118IN 663H

## (undated) DEVICE — DISPOSABLE TOURNIQUET CUFF SINGLE BLADDER, SINGLE PORT AND QUICK CONNECT CONNECTOR: Brand: COLOR CUFF

## (undated) DEVICE — KNEE ARTHROSCOPY-LF: Brand: MEDLINE INDUSTRIES, INC.

## (undated) DEVICE — FMS FLUID MANAGEMENT SYSTEM INFLOW TUBING (FMS VUE): Brand: FMS

## (undated) DEVICE — DRSNG GZ PETROLTM XEROFORM CURAD 1X8IN STRL

## (undated) DEVICE — DRSNG PAD ABD 8X10IN STRL

## (undated) DEVICE — SOL IRR NACL 0.9PCT 3000ML

## (undated) DEVICE — BNDG COTN/ELAS FLEXMASTER DBL/LENGTH CLIP/CLS 6IN 11YD

## (undated) DEVICE — GOWN,REINFORCE,POLY,SIRUS,BREATH SLV,XLG: Brand: MEDLINE

## (undated) DEVICE — STERILE POLYISOPRENE POWDER-FREE SURGICAL GLOVES: Brand: PROTEXIS

## (undated) DEVICE — FMS FLUID MANAGEMENT SYSTEM OUTFLOW TUBING WITHOUT ONE-WAY VALVE (FMS VUE OR FMS DUO PLUS): Brand: FMS

## (undated) DEVICE — BLD CUT FORMLA AGGR PLS 4.0MM

## (undated) DEVICE — GLV SURG SENSICARE SLT PF LF 7 STRL

## (undated) DEVICE — GAUZE,SPONGE,4"X4",16PLY,STRL,LF,10/TRAY: Brand: MEDLINE

## (undated) DEVICE — GLV SURG ULTRAFREE MAX LTX PF 8

## (undated) DEVICE — GLV SURG SENSICARE PI PF LF 7 GRN STRL

## (undated) DEVICE — APPL CHLORAPREP HI/LITE 26ML ORNG